# Patient Record
Sex: MALE | Race: WHITE | NOT HISPANIC OR LATINO | Employment: OTHER | URBAN - METROPOLITAN AREA
[De-identification: names, ages, dates, MRNs, and addresses within clinical notes are randomized per-mention and may not be internally consistent; named-entity substitution may affect disease eponyms.]

---

## 2017-01-20 ENCOUNTER — TELEPHONE (OUTPATIENT)
Dept: PEDIATRICS | Facility: CLINIC | Age: 67
End: 2017-01-20

## 2017-01-20 DIAGNOSIS — R25.1 TREMOR: Primary | ICD-10-CM

## 2017-01-20 RX ORDER — PRIMIDONE 50 MG/1
50 TABLET ORAL AT BEDTIME
Qty: 30 TABLET | Refills: 2 | Status: SHIPPED | OUTPATIENT
Start: 2017-01-20 | End: 2017-03-14 | Stop reason: DRUGHIGH

## 2017-01-20 NOTE — TELEPHONE ENCOUNTER
"Pt notifies that he has a hereditary condition called \"central tremor\", his father, G.father & G.G.father had it. Now his older daughter(29 yrs) has mild tremor sx's. Pt has constant tremors, can be noticeable by others, worse on R side side. Denies balance issues, fall, weakness in one side of body.     He consulted with [facility] in 1974, they advised to try Inderal, but didn't try the med at that time. Because of the worsening of tremors, he would like to try any medication to help his sx's(has been reading in net - Inderal/gabapentin/mysoline). He wants 's advise on this. LOV was on 12/20/16    Pt can be reached at [phone](OK to LM).    Dayanna, RN  Triage Nurse          "

## 2017-01-20 NOTE — TELEPHONE ENCOUNTER
Pt called, reached VM.  Recommend mysoline 50 mg QHS. Rx sent to [facility].   Call if has questions or if the med does not work or has side effects.

## 2017-03-14 ENCOUNTER — TELEPHONE (OUTPATIENT)
Dept: PEDIATRICS | Facility: CLINIC | Age: 67
End: 2017-03-14

## 2017-03-14 DIAGNOSIS — R25.1 TREMOR: ICD-10-CM

## 2017-03-14 RX ORDER — PRIMIDONE 50 MG/1
100 TABLET ORAL AT BEDTIME
Qty: 60 TABLET | Refills: 2 | Status: SHIPPED | OUTPATIENT
Start: 2017-03-14 | End: 2017-03-29

## 2017-03-14 NOTE — TELEPHONE ENCOUNTER
Please call:  The dose of Primidone can be increased. I recommend going to 100 mg at bedtime. He can take 2 of the 50 mg tablets at one time.    The dose can go up further if needed. I recommend trying the 100 mg dose for 1-2 weeks, then call if he needs a higher dose.    A new prescription for 2 tablets at bedtime was sent to [facility]

## 2017-03-14 NOTE — TELEPHONE ENCOUNTER
Patient calling with an update on the Primidone 50 mg qhs. Helped decrease tremors when taking during the first couple weeks, but now they are coming back.     Routing to Dr. Chaney to advise on either a dose increase or alternative medication.     Call back patient on [phone], ok to leave detailed message.

## 2017-03-15 NOTE — TELEPHONE ENCOUNTER
Called and spoke with patient. Confirmed with patient  Dr. Chaney's instructions regarding Primidone. Sandra Billy MA

## 2017-03-29 ENCOUNTER — TELEPHONE (OUTPATIENT)
Dept: PEDIATRICS | Facility: CLINIC | Age: 67
End: 2017-03-29

## 2017-03-29 NOTE — TELEPHONE ENCOUNTER
Pt called. Can decrease to 50 mg nightly for 1 week then can d/c.  Consider propranolol 60 mg daily if he wants in the future.

## 2017-03-29 NOTE — TELEPHONE ENCOUNTER
Pt. Calling states the Primadone is still not helping. Currently taking 100 mg dose tablets at bedtime.    He is wondering if he can taper down as he does not like the way he feels. He would rather not take it.     Please advise.     Cassidy Lemus, RN, BSN, PHN

## 2017-05-24 ENCOUNTER — TRANSFERRED RECORDS (OUTPATIENT)
Dept: HEALTH INFORMATION MANAGEMENT | Facility: CLINIC | Age: 67
End: 2017-05-24

## 2017-05-31 ENCOUNTER — TELEPHONE (OUTPATIENT)
Dept: PEDIATRICS | Facility: CLINIC | Age: 67
End: 2017-05-31

## 2017-08-15 ENCOUNTER — TELEPHONE (OUTPATIENT)
Dept: PEDIATRICS | Facility: CLINIC | Age: 67
End: 2017-08-15

## 2017-09-06 ENCOUNTER — TELEPHONE (OUTPATIENT)
Dept: PEDIATRICS | Facility: CLINIC | Age: 67
End: 2017-09-06

## 2017-09-06 DIAGNOSIS — N52.9 ERECTILE DYSFUNCTION, UNSPECIFIED ERECTILE DYSFUNCTION TYPE: Primary | ICD-10-CM

## 2017-09-06 RX ORDER — SILDENAFIL CITRATE 20 MG/1
TABLET ORAL
Qty: 30 TABLET | Refills: 11 | Status: SHIPPED | OUTPATIENT
Start: 2017-09-06 | End: 2018-03-10

## 2017-09-06 NOTE — TELEPHONE ENCOUNTER
Please call:  Rx for sildenafil 20 mg was sent to [facility].  Dosing is 2-5 pills per dose ( mg) as needed.

## 2017-12-22 ENCOUNTER — OFFICE VISIT (OUTPATIENT)
Dept: PEDIATRICS | Facility: CLINIC | Age: 67
End: 2017-12-22
Payer: COMMERCIAL

## 2017-12-22 ENCOUNTER — RADIANT APPOINTMENT (OUTPATIENT)
Dept: GENERAL RADIOLOGY | Facility: CLINIC | Age: 67
End: 2017-12-22
Attending: INTERNAL MEDICINE
Payer: COMMERCIAL

## 2017-12-22 VITALS
HEIGHT: 69 IN | HEART RATE: 46 BPM | TEMPERATURE: 97.8 F | OXYGEN SATURATION: 95 % | DIASTOLIC BLOOD PRESSURE: 70 MMHG | SYSTOLIC BLOOD PRESSURE: 124 MMHG | WEIGHT: 194 LBS | BODY MASS INDEX: 28.73 KG/M2

## 2017-12-22 DIAGNOSIS — N52.9 ERECTILE DYSFUNCTION, UNSPECIFIED ERECTILE DYSFUNCTION TYPE: ICD-10-CM

## 2017-12-22 DIAGNOSIS — M79.604 PAIN OF RIGHT LOWER EXTREMITY: ICD-10-CM

## 2017-12-22 DIAGNOSIS — Z13.6 SCREENING FOR ABDOMINAL AORTIC ANEURYSM: ICD-10-CM

## 2017-12-22 DIAGNOSIS — Z00.00 ROUTINE GENERAL MEDICAL EXAMINATION AT A HEALTH CARE FACILITY: Primary | ICD-10-CM

## 2017-12-22 LAB
CHOLEST SERPL-MCNC: 165 MG/DL
GLUCOSE SERPL-MCNC: 93 MG/DL (ref 70–99)
HDLC SERPL-MCNC: 54 MG/DL
LDLC SERPL CALC-MCNC: 95 MG/DL
NONHDLC SERPL-MCNC: 111 MG/DL
PSA SERPL-ACNC: 1.83 UG/L (ref 0–4)
TRIGL SERPL-MCNC: 78 MG/DL

## 2017-12-22 PROCEDURE — 99397 PER PM REEVAL EST PAT 65+ YR: CPT | Performed by: INTERNAL MEDICINE

## 2017-12-22 PROCEDURE — 73590 X-RAY EXAM OF LOWER LEG: CPT | Mod: RT

## 2017-12-22 PROCEDURE — 82947 ASSAY GLUCOSE BLOOD QUANT: CPT | Performed by: INTERNAL MEDICINE

## 2017-12-22 PROCEDURE — G0103 PSA SCREENING: HCPCS | Performed by: INTERNAL MEDICINE

## 2017-12-22 PROCEDURE — 80061 LIPID PANEL: CPT | Performed by: INTERNAL MEDICINE

## 2017-12-22 PROCEDURE — 36415 COLL VENOUS BLD VENIPUNCTURE: CPT | Performed by: INTERNAL MEDICINE

## 2017-12-22 PROCEDURE — 84403 ASSAY OF TOTAL TESTOSTERONE: CPT | Performed by: INTERNAL MEDICINE

## 2017-12-22 PROCEDURE — 99213 OFFICE O/P EST LOW 20 MIN: CPT | Mod: 25 | Performed by: INTERNAL MEDICINE

## 2017-12-22 ASSESSMENT — ACTIVITIES OF DAILY LIVING (ADL)
CURRENT_FUNCTION: NO ASSISTANCE NEEDED
I_NEED_ASSISTANCE_FOR_THE_FOLLOWING_DAILY_ACTIVITIES:: NO ASSISTANCE IS NEEDED

## 2017-12-22 NOTE — LETTER
[facility]  [address] [place] [id number]                  [phone]   January 2, 2018    Ru Coombs  [address] [place] [address]      Ru:     Your tests are all complete. The results show:     1. Your testosterone level is in the normal range. You do not need a supplement at this time.     2. Your cholesterol profile looks great!     Your Lipid Goals:   Cholesterol: Desirable is less than 200, Borderline is 200-240   HDL (Good Cholesterol): Desirable is greater than 40   LDL (Bad Cholesterol): Desirable is less than 130, Borderline 130-160   Triglycerides (Fats in the Blood): Desirable is less than 150,  Borderline is 150-200     3. Your glucose (blood sugar, a diabetes screen) is normal.     4. Your PSA (prostate level) is also normal.     5. Your abdominal aortic ultrasound shows that you do not have an aneurysm. There is some plaque noted; this is common to see. To help control the plaque build-up, I recommend that you continue with what you are currently doing with regular exercise, keeping your cholesterol levels controlled and keeping your weight controlled.       Please feel free to contact me if you have any questions or concerns.       James Chaney MD         Results for orders placed or performed in visit on 12/22/17   Lipid Profile (Chol, Trig, HDL, LDL calc)   Result Value Ref Range    Cholesterol 165 <200 mg/dL    Triglycerides 78 <150 mg/dL    HDL Cholesterol 54 >39 mg/dL    LDL Cholesterol Calculated 95 <100 mg/dL    Non HDL Cholesterol 111 <130 mg/dL   Glucose   Result Value Ref Range    Glucose 93 70 - 99 mg/dL   PSA, screen   Result Value Ref Range    PSA 1.83 0 - 4 ug/L   Testosterone, total   Result Value Ref Range    Testosterone Total 785 240 - 950 ng/dL   ORTHO [facility] REFERRAL    Impression    Patient is scheduled with Dr Frank in  on 1-4-18.

## 2017-12-22 NOTE — NURSING NOTE
"Chief Complaint   Patient presents with     Physical       Initial /70 (Cuff Size: Adult Regular)  Pulse (!) 46  Temp 97.8  F (36.6  C) (Oral)  Ht 5' 9\" (1.753 m)  Wt 194 lb (88 kg)  SpO2 95%  BMI 28.65 kg/m2 Estimated body mass index is 28.65 kg/(m^2) as calculated from the following:    Height as of this encounter: 5' 9\" (1.753 m).    Weight as of this encounter: 194 lb (88 kg).  Medication Reconciliation: complete    Raina Mooney   "

## 2017-12-22 NOTE — PROGRESS NOTES
SUBJECTIVE:   Ru Coombs is a 67 year old male who presents for Preventive Visit.    Are you in the first 12 months of your Medicare coverage?  No    Physical   Annual:     Getting at least 3 servings of Calcium per day::  Yes    Bi-annual eye exam::  Yes    Dental care twice a year::  Yes    Sleep apnea or symptoms of sleep apnea::  None    Diet::  Regular (no restrictions)    Frequency of exercise::  4-5 days/week    Duration of exercise::  30-45 minutes    Taking medications regularly::  Yes    Medication side effects::  None    Additional concerns today::  YES    Ability to successfully perform activities of daily living: no assistance needed  Home Safety:  No safety concerns identified  Hearing Impairment: no hearing concerns    Ongoing right leg pain with occasional muscle cramps. Did have a fall over the past summer while working on home remodel. Initial pain in the right hip area. Now starts at the knee and radiates into the foot.  Lateral aspect of the knee near the proximal tibia. No knee swelling. No current hip pain. No numbness in the leg. Sx are better w/ rest.      Snoring has improved - using wedge pillow.    ED. Takes sildenafil prn. Has significant decrease in libido. Questions if he could have a low testosterone level.    Fall risk:  Fallen 2 or more times in the past year?: No  Any fall with injury in the past year?: No    COGNITIVE SCREEN  1) Repeat 3 items (Banana, Sunrise, Chair)    2) Clock draw: NORMAL  3) 3 item recall: Recalls 3 objects  Results: 3 items recalled: COGNITIVE IMPAIRMENT LESS LIKELY    Mini-CogTM Copyright S Lety. Licensed by the author for use in [facility]; reprinted with permission (artem@[facility]). All rights reserved.          Reviewed and updated as needed this visit by clinical staff  Tobacco  Allergies  Meds         Reviewed and updated as needed this visit by Provider  Tobacco  Allergies  Meds  Problems  Med Hx  Surg Hx  Fam Hx  Soc Hx        "  Social History   Substance Use Topics     Smoking status: Former Smoker     Packs/day: 3.00     Years: 10.00     Quit date: 1/1/1988     Smokeless tobacco: Never Used      Comment: quit 1988     Alcohol use 0.0 oz/week     0 Standard drinks or equivalent per week      Comment: No more than 3 drinks per week        Alcohol Use 12/22/2017   If you drink alcohol, do you typically have greater than 3 drinks per day OR greater than 7 drinks per week?   No   No flowsheet data found.    Today's PHQ-2 Score:   PHQ-2 ( 1999 Pf[employer]) 12/22/2017   Q1: Little interest or pleasure in doing things 0   Q2: Feeling down, depressed or hopeless 0   PHQ-2 Score 0   Q1: Little interest or pleasure in doing things Not at all   Q2: Feeling down, depressed or hopeless Not at all   PHQ-2 Score 0       Do you feel safe in your environment - Yes    Do you have a Health Care Directive?: Yes: Advance Directive has been received and scanned.    Current providers sharing in care for this patient include:   Patient Care Team:  James Chaney MD as PCP - General    The following health maintenance items are reviewed in Epic and correct as of today:  Health Maintenance   Topic Date Due     AORTIC ANEURYSM SCREENING (SYSTEM ASSIGNED)  10/11/2014     INFLUENZA VACCINE (SYSTEM ASSIGNED)  09/01/2017     FALL RISK ASSESSMENT  12/20/2017     ADVANCE DIRECTIVE PLANNING Q5 YRS  12/15/2020     LIPID MONITORING Q5 YEARS  12/20/2021     COLONOSCOPY Q10 YR  01/13/2025     TETANUS Q10 YR  01/05/2026     PNEUMOCOCCAL  Completed     HEPATITIS C SCREENING  Completed     Labs reviewed in EPIC      Review of Systems  Constitutional, HEENT, cardiovascular, pulmonary, gi and gu systems are negative, except as otherwise noted.      OBJECTIVE:   /70 (Cuff Size: Adult Regular)  Pulse (!) 46  Temp 97.8  F (36.6  C) (Oral)  Ht 5' 9\" (1.753 m)  Wt 194 lb (88 kg)  SpO2 95%  BMI 28.65 kg/m2 Estimated body mass index is 28.65 kg/(m^2) as calculated from the " "following:    Height as of this encounter: 5' 9\" (1.753 m).    Weight as of this encounter: 194 lb (88 kg).  Physical Exam  GENERAL: healthy, alert and no distress  EYES: Eyes grossly normal to inspection, PERRL and conjunctivae and sclerae normal  HENT: ear canals and TM's normal, nose and mouth without ulcers or lesions  NECK: no adenopathy, no asymmetry, masses, or scars and thyroid normal to palpation  RESP: lungs clear to auscultation - no rales, rhonchi or wheezes  CV: regular rate and rhythm, normal S1 S2, no S3 or S4, no murmur, click or rub, no peripheral edema and peripheral pulses strong  ABDOMEN: soft, nontender, no hepatosplenomegaly, no masses and bowel sounds normal  MS: right knee w/o effusion. FROM. No crepitus. Ligaments clinically intact. Negative Jayla's.   SKIN: no suspicious lesions or rashes  NEURO: Normal strength and tone, mentation intact and speech normal  PSYCH: mentation appears normal, affect normal/bright    Recent Results (from the past 744 hour(s))   XR Tibia & Fibula Right 2 Views    Narrative    RIGHT TIBIA-FIBULA TWO VIEWS 12/22/2017 8:34 AM     HISTORY:  Pain of right lower extremity.    COMPARISON: None.      Impression    IMPRESSION: Normal.    YAKELIN REYES MD          ASSESSMENT / PLAN:       ICD-10-CM    1. Routine general medical examination at a health care facility Z00.00 Lipid Profile (Chol, Trig, HDL, LDL calc)     Glucose     PSA, screen   2. Erectile dysfunction, unspecified erectile dysfunction type N52.9 Testosterone, total   3. Screening for abdominal aortic aneurysm Z13.6 US abdominal aorta limited   4. Pain of right lower extremity M79.604 XR Tibia & Fibula Right 2 Views     ORTHO  REFERRAL     OFFICE/OUTPT VISIT,EST,LEVL III     Right leg pain - unclear cause. Could be nerve impingement vs ligament vs other. Will refer to [facility] for further evaluation.     ED. Check T level today.     Hx of smoking. AAA u/s ordered.     End of Life " "Planning:  Patient currently has an advanced directive: Yes.  Practitioner is supportive of decision.    COUNSELING:  Reviewed preventive health counseling, as reflected in patient instructions        Estimated body mass index is 28.65 kg/(m^2) as calculated from the following:    Height as of this encounter: 5' 9\" (1.753 m).    Weight as of this encounter: 194 lb (88 kg).     reports that he quit smoking about 29 years ago. He has a 30.00 pack-year smoking history. He has never used smokeless tobacco.        Appropriate preventive services were discussed with this patient, including applicable screening as appropriate for cardiovascular disease, diabetes, osteopenia/osteoporosis, and glaucoma.  As appropriate for age/gender, discussed screening for colorectal cancer, prostate cancer. Checklist reviewing preventive services available has been given to the patient.    Reviewed patients plan of care and provided an AVS. The Basic Care Plan (routine screening as documented in Health Maintenance) for Ru meets the Care Plan requirement. This Care Plan has been established and reviewed with the Patient.      James Chaney MD  [facility]  "

## 2017-12-22 NOTE — PATIENT INSTRUCTIONS
Preventive Health Recommendations:     Yearly exam:             See your health care provider every year in order to  o   Review health changes.   o   Discuss preventive care.    o   Review your medicines.    Every 1-2 years, have a diabetes test (fasting glucose).     Every 1-2 years, have a cholesterol test.     Have a colonoscopy again in 2025.  Shots:     Get a flu shot each year.     Get a tetanus shot every 10 years.   Nutrition:     Eat at least 5 servings of fruits and vegetables each day.     Eat whole-grain bread, whole-wheat pasta and brown rice instead of white grains and rice.     Get adequate Calcium and Vitamin D.   Lifestyle    Exercise for at least 150 minutes a week (30 minutes a day, 5 days a week). This will help you control your weight and prevent disease.     Limit alcohol to one drink per day.     No smoking.     Wear sunscreen to prevent skin cancer.     See your dentist every six months for an exam and cleaning.     See your eye doctor every 1 to 2 years to screen for conditions such as glaucoma, macular degeneration and cataracts.

## 2017-12-22 NOTE — MR AVS SNAPSHOT
After Visit Summary   12/22/2017    Ru Coombs    MRN: [id number]           Patient Information     Date Of Birth          1950        Visit Information        Provider Department      12/22/2017 7:50 AM James Chaney MD [facility]        Today's Diagnoses     Routine general medical examination at a health care facility    -  1    Erectile dysfunction, unspecified erectile dysfunction type        Screening for abdominal aortic aneurysm        Pain of right lower extremity          Care Instructions      Preventive Health Recommendations:     Yearly exam:             See your health care provider every year in order to  o   Review health changes.   o   Discuss preventive care.    o   Review your medicines.    Every 1-2 years, have a diabetes test (fasting glucose).     Every 1-2 years, have a cholesterol test.     Have a colonoscopy again in 2025.  Shots:     Get a flu shot each year.     Get a tetanus shot every 10 years.   Nutrition:     Eat at least 5 servings of fruits and vegetables each day.     Eat whole-grain bread, whole-wheat pasta and brown rice instead of white grains and rice.     Get adequate Calcium and Vitamin D.   Lifestyle    Exercise for at least 150 minutes a week (30 minutes a day, 5 days a week). This will help you control your weight and prevent disease.     Limit alcohol to one drink per day.     No smoking.     Wear sunscreen to prevent skin cancer.     See your dentist every six months for an exam and cleaning.     See your eye doctor every 1 to 2 years to screen for conditions such as glaucoma, macular degeneration and cataracts.          Follow-ups after your visit        Additional Services     ORTHO  REFERRAL       [facility] is referring you to the Orthopedic  Services at [facility].       The  Representative will assist you in the coordination of your Orthopedic and Musculoskeletal  "Care as prescribed by your physician.    The  Representative will call you within 1 business day to help schedule your appointment, or you may contact the Critical access hospital Representative at:    All areas ~ [phone]     Type of Referral : Non Surgical       Timeframe requested: Routine    Coverage of these services is subject to the terms and limitations of your health insurance plan.  Please call member services at your health plan with any benefit or coverage questions.      If X-rays, CT or MRI's have been performed, please contact the facility where they were done to arrange for , prior to your scheduled appointment.  Please bring this referral request to your appointment and present it to your specialist.                  Future tests that were ordered for you today     Open Future Orders        Priority Expected Expires Ordered    US abdominal aorta limited Routine  12/22/2018 12/22/2017            Who to contact     If you have questions or need follow up information about today's clinic visit or your schedule please contact [facility] directly at [phone].  Normal or non-critical lab and imaging results will be communicated to you by Roomsterhart, letter or phone within 4 business days after the clinic has received the results. If you do not hear from us within 7 days, please contact the clinic through Roomsterhart or phone. If you have a critical or abnormal lab result, we will notify you by phone as soon as possible.  Submit refill requests through TopPatch or call your pharmacy and they will forward the refill request to us. Please allow 3 business days for your refill to be completed.          Additional Information About Your Visit        [facility] Information     TopPatch lets you send messages to your doctor, view your test results, renew your prescriptions, schedule appointments and more. To sign up, go to [email or web address] . Click on \"Log in\" on the left side of the screen, " "which will take you to the Welcome page. Then click on \"Sign up Now\" on the right side of the page.     You will be asked to enter the access code listed below, as well as some personal information. Please follow the directions to create your username and password.     Your access code is: [email or web address]  Expires: 3/22/2018  8:57 AM     Your access code will  in 90 days. If you need help or a new code, please call your [place] clinic or [phone].        Care EveryWhere ID     This is your Care EveryWhere ID. This could be used by other organizations to access your [facility] [place] medical records  [id number]        Your Vitals Were     Pulse Temperature Height Pulse Oximetry BMI (Body Mass Index)       46 97.8  F (36.6  C) (Oral) 5' 9\" (1.753 m) 95% 28.65 kg/m2        Blood Pressure from Last 3 Encounters:   17 124/70   16 122/72   12/14/15 124/70    Weight from Last 3 Encounters:   17 194 lb (88 kg)   16 185 lb 1.6 oz (84 kg)   12/14/15 192 lb (87.1 kg)              We Performed the Following     Glucose     Lipid Profile (Chol, Trig, HDL, LDL calc)     ORTHO  REFERRAL     PSA, screen     Testosterone, total        Primary Care Provider Office Phone # Fax #    James Chaney [phone] [address] [place] [address]        Equal Access to Services     [facility]: Hadii aad ku hadasho Soomaali, waaxda luqadaha, qaybta kaalmada adeegyada, arnold peterson . So [facility] [phone].    ATENCIÓN: Si habla español, tiene a govea disposición servicios gratuitos de asistencia lingüística. Llame al [phone].    We comply with applicable federal civil rights laws and [place] laws. We do not discriminate on the basis of race, color, national origin, age, disability, sex, sexual orientation, or gender identity.            Thank you!     Thank you for choosing [facility]  for your care. Our goal is always to provide " you with excellent care. Hearing back from our patients is one way we can continue to improve our services. Please take a few minutes to complete the written survey that you may receive in the mail after your visit with us. Thank you!             Your Updated Medication List - Protect others around you: Learn how to safely use, store and throw away your medicines at www.disposemymeds.org.          This list is accurate as of: 12/22/17  8:57 AM.  Always use your most recent med list.                   Brand Name Dispense Instructions for use Diagnosis    aspirin 325 MG EC tablet     40 tablet    Take 1 tablet (325 mg) by mouth daily    Routine general medical examination at a health care facility       Multi-vitamin Tabs tablet   Generic drug:  multivitamin, therapeutic with minerals           OMEGA-3 FISH OIL PO      Take 1,000 mg by mouth once        Selenium 100 MCG Caps      1 qd        sildenafil 20 MG tablet    REVATIO    30 tablet     mg po QD prn    Erectile dysfunction, unspecified erectile dysfunction type

## 2017-12-27 LAB — TESTOST SERPL-MCNC: 785 NG/DL (ref 240–950)

## 2017-12-29 ENCOUNTER — HOSPITAL ENCOUNTER (OUTPATIENT)
Dept: ULTRASOUND IMAGING | Facility: CLINIC | Age: 67
Discharge: HOME | End: 2017-12-29
Attending: INTERNAL MEDICINE | Admitting: INTERNAL MEDICINE
Payer: COMMERCIAL

## 2017-12-29 DIAGNOSIS — Z13.6 SCREENING FOR ABDOMINAL AORTIC ANEURYSM: ICD-10-CM

## 2017-12-29 PROCEDURE — 76775 US EXAM ABDO BACK WALL LIM: CPT

## 2018-01-04 ENCOUNTER — OFFICE VISIT (OUTPATIENT)
Dept: ORTHOPEDICS | Facility: CLINIC | Age: 68
End: 2018-01-04
Payer: COMMERCIAL

## 2018-01-04 ENCOUNTER — RADIANT APPOINTMENT (OUTPATIENT)
Dept: GENERAL RADIOLOGY | Facility: CLINIC | Age: 68
End: 2018-01-04
Attending: FAMILY MEDICINE
Payer: COMMERCIAL

## 2018-01-04 VITALS
DIASTOLIC BLOOD PRESSURE: 80 MMHG | SYSTOLIC BLOOD PRESSURE: 130 MMHG | HEIGHT: 69 IN | BODY MASS INDEX: 28.73 KG/M2 | WEIGHT: 194 LBS

## 2018-01-04 DIAGNOSIS — M25.551 PAIN IN JOINT INVOLVING RIGHT PELVIC REGION AND THIGH: Primary | ICD-10-CM

## 2018-01-04 DIAGNOSIS — M25.551 PAIN IN JOINT INVOLVING RIGHT PELVIC REGION AND THIGH: ICD-10-CM

## 2018-01-04 DIAGNOSIS — M70.61 TROCHANTERIC BURSITIS OF RIGHT HIP: ICD-10-CM

## 2018-01-04 PROCEDURE — 99203 OFFICE O/P NEW LOW 30 MIN: CPT | Performed by: FAMILY MEDICINE
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
## 2018-01-04 NOTE — MR AVS SNAPSHOT
After Visit Summary   1/4/2018    Ru Coombs    MRN: [id number]           Patient Information     Date Of Birth          1950        Visit Information        Provider Department      1/4/2018 8:20 AM Travis Frank MD [facility]        Today's Diagnoses     Pain in joint involving right pelvic region and thigh    -  1    Trochanteric bursitis of right hip           Follow-ups after your visit        Additional Services     [facility] PT, HAND, AND CHIROPRACTIC REFERRAL       **This order will print in the [facility] Scheduling Office**    Physical Therapy, Hand Therapy and Chiropractic Care are available through:    *[facility]  *[facility]  *[facility]    Call one number to schedule at any of the above locations: [phone].    Your provider has referred you to: Physical Therapy at [facility] or [facility]    Indication/Reason for Referral:    Pain in joint involving right pelvic region and thigh  Trochanteric bursitis of right hip    Onset of Illness: 9 months  Therapy Orders: Evaluate and Treat  Special Programs: None  Special Request: None    Khloe Ledbetter      Additional Comments for the Therapist or Chiropractor: pt wants only 1-2 visits and HEP    Please be aware that coverage of these services is subject to the terms and limitations of your health insurance plan.  Call member services at your health plan with any benefit or coverage questions.      Please bring the following to your appointment:    *Your personal calendar for scheduling future appointments  *Comfortable clothing                  Who to contact     If you have questions or need follow up information about today's clinic visit or your schedule please contact [facility] directly at [phone].  Normal or non-critical lab and imaging results will be communicated to you by MyChart, letter or phone within 4 business days after the clinic  "has received the results. If you do not hear from us within 7 days, please contact the clinic through [employer] or phone. If you have a critical or abnormal lab result, we will notify you by phone as soon as possible.  Submit refill requests through [employer] or call your pharmacy and they will forward the refill request to us. Please allow 3 business days for your refill to be completed.          Additional Information About Your Visit        [employer] Information     [employer] lets you send messages to your doctor, view your test results, renew your prescriptions, schedule appointments and more. To sign up, go to [email or web address] . Click on \"Log in\" on the left side of the screen, which will take you to the Welcome page. Then click on \"Sign up Now\" on the right side of the page.     You will be asked to enter the access code listed below, as well as some personal information. Please follow the directions to create your username and password.     Your access code is: [email or web address]  Expires: 3/22/2018  8:57 AM     Your access code will  in 90 days. If you need help or a new code, please call your [facility] clinic or [phone].        Care EveryWhere ID     This is your Care EveryWhere ID. This could be used by other organizations to access your [facility] medical records  [id number]        Your Vitals Were     Height BMI (Body Mass Index)                5' 9\" (1.753 m) 28.65 kg/m2           Blood Pressure from Last 3 Encounters:   18 130/80   17 124/70   16 122/72    Weight from Last 3 Encounters:   18 194 lb (88 kg)   17 194 lb (88 kg)   16 185 lb 1.6 oz (84 kg)              We Performed the Following     [facility] PT, HAND, AND CHIROPRACTIC REFERRAL        Primary Care Provider Office Phone # Fax #    James Chaney [phone] [address] [place] [address]        Equal Access to Services     [facility]: Gladys Pulido, " brendada gordomonisha, treasureybta kaprecious amin, arnold pabloalen ah. So [facility] [phone].    ATENCIÓN: Si kunal momin, tiene a govea disposición servicios gratuitos de asistencia lingüística. Bladimir al [phone].    We comply with applicable federal civil rights laws and [place] laws. We do not discriminate on the basis of race, color, national origin, age, disability, sex, sexual orientation, or gender identity.            Thank you!     Thank you for choosing [facility]  for your care. Our goal is always to provide you with excellent care. Hearing back from our patients is one way we can continue to improve our services. Please take a few minutes to complete the written survey that you may receive in the mail after your visit with us. Thank you!             Your Updated Medication List - Protect others around you: Learn how to safely use, store and throw away your medicines at www.disposemymeds.org.          This list is accurate as of: 1/4/18 11:54 AM.  Always use your most recent med list.                   Brand Name Dispense Instructions for use Diagnosis    aspirin 325 MG EC tablet     40 tablet    Take 1 tablet (325 mg) by mouth daily    Routine general medical examination at a health care facility       calcium-vitamin D 600-400 MG-UNIT per tablet    CALTRATE     Take 1 tablet by mouth 2 times daily        Multi-vitamin Tabs tablet   Generic drug:  multivitamin, therapeutic with minerals           OMEGA-3 FISH OIL PO      Take 1,000 mg by mouth once        Selenium 100 MCG Caps      1 qd        sildenafil 20 MG tablet    REVATIO    30 tablet     mg po QD prn    Erectile dysfunction, unspecified erectile dysfunction type
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
## 2018-01-08 ENCOUNTER — THERAPY VISIT (OUTPATIENT)
Dept: PHYSICAL THERAPY | Facility: CLINIC | Age: 68
End: 2018-01-08
Payer: COMMERCIAL

## 2018-01-08 DIAGNOSIS — M25.551 HIP PAIN, RIGHT: Primary | ICD-10-CM

## 2018-01-08 PROCEDURE — 97110 THERAPEUTIC EXERCISES: CPT | Mod: GP | Performed by: PHYSICAL THERAPIST

## 2018-01-08 PROCEDURE — 97161 PT EVAL LOW COMPLEX 20 MIN: CPT | Mod: GP | Performed by: PHYSICAL THERAPIST

## 2018-01-08 PROCEDURE — 97530 THERAPEUTIC ACTIVITIES: CPT | Mod: GP | Performed by: PHYSICAL THERAPIST

## 2018-01-08 ASSESSMENT — ACTIVITIES OF DAILY LIVING (ADL)
STANDING_FOR_15_MINUTES: NO DIFFICULTY AT ALL
RECREATIONAL_ACTIVITIES: NO DIFFICULTY AT ALL
GOING_DOWN_1_FLIGHT_OF_STAIRS: NO DIFFICULTY AT ALL
STEPPING_UP_AND_DOWN_CURBS: NO DIFFICULTY AT ALL
HOS_ADL_HIGHEST_POTENTIAL_SCORE: 68
HOS_ADL_COUNT: 17
GETTING_INTO_AND_OUT_OF_A_BATHTUB: UNABLE TO DO
GOING_UP_1_FLIGHT_OF_STAIRS: MODERATE DIFFICULTY
HOS_ADL_ITEM_SCORE_TOTAL: 56
HOW_WOULD_YOU_RATE_YOUR_CURRENT_LEVEL_OF_FUNCTION_DURING_YOUR_USUAL_ACTIVITIES_OF_DAILY_LIVING_FROM_0_TO_100_WITH_100_BEING_YOUR_LEVEL_OF_FUNCTION_PRIOR_TO_YOUR_HIP_PROBLEM_AND_0_BEING_THE_INABILITY_TO_PERFORM_ANY_OF_YOUR_USUAL_DAILY_ACTIVITIES?: 85
GETTING_INTO_AND_OUT_OF_AN_AVERAGE_CAR: SLIGHT DIFFICULTY
WALKING_INITIALLY: NO DIFFICULTY AT ALL
WALKING_15_MINUTES_OR_GREATER: SLIGHT DIFFICULTY
WALKING_UP_STEEP_HILLS: MODERATE DIFFICULTY
HOS_ADL_SCORE(%): 82.35
HEAVY_WORK: NO DIFFICULTY AT ALL
PUTTING_ON_SOCKS_AND_SHOES: NO DIFFICULTY AT ALL
LIGHT_TO_MODERATE_WORK: SLIGHT DIFFICULTY
DEEP_SQUATTING: NO DIFFICULTY AT ALL
ROLLING_OVER_IN_BED: SLIGHT DIFFICULTY
TWISTING/PIVOTING_ON_INVOLVED_LEG: NO DIFFICULTY AT ALL
WALKING_DOWN_STEEP_HILLS: NO DIFFICULTY AT ALL
SITTING_FOR_15_MINUTES: NO DIFFICULTY AT ALL
WALKING_APPROXIMATELY_10_MINUTES: NO DIFFICULTY AT ALL

## 2018-01-08 NOTE — MR AVS SNAPSHOT
After Visit Summary   1/8/2018    Ru Coombs    MRN: [id number]           Patient Information     Date Of Birth          1950        Visit Information        Provider Department      1/8/2018 8:50 AM Raymond Machado, PT [facility]        Today's Diagnoses     Hip pain, right    -  1       Follow-ups after your visit        Your next 10 appointments already scheduled     Adriano 15, 2018  8:50 AM CST   JACKELINE Extremity with Raymond Machado PT   [facility] ([facility]  )    [address] [place] [id number]   [phone]            Jan 22, 2018  8:50 AM CST   JACKELINE Extremity with Raymond Machado PT   [facility] ([facility]  )    [address] [place] [id number] [phone]            Jan 29, 2018  8:50 AM CST   JACKELINE Extremity with Raymond Machado PT   [facility] ([facility]  )    [address] [place] [id number]   [phone]              Who to contact     If you have questions or need follow up information about today's clinic visit or your schedule please contact [facility] directly at [phone].  Normal or non-critical lab and imaging results will be communicated to you by MyChart, letter or phone within 4 business days after the clinic has received the results. If you do not hear from us within 7 days, please contact the clinic through MyChart or phone. If you have a critical or abnormal lab result, we will notify you by phone as soon as possible.  Submit refill requests through [employer] or call your pharmacy and they will forward the refill request to us. Please allow 3 business days for your refill to be completed.          Additional Information About Your Visit       "  MyChart Information     [employer] system lets you send messages to your doctor, view your test results, renew your prescriptions, schedule appointments and more. To sign up, go to [email or web address] . Click on \"Log in\" on the left side of the screen, which will take you to the Welcome page. Then click on \"Sign up Now\" on the right side of the page.     You will be asked to enter the access code listed below, as well as some personal information. Please follow the directions to create your username and password.     Your access code is: [email or web address]  Expires: 3/22/2018  8:57 AM     Your access code will  in 90 days. If you need help or a new code, please call your [facility] clinic or [phone].        Care EveryWhere ID     This is your Care EveryWhere ID. This could be used by other organizations to access your [facility] medical records  [id number]         Blood Pressure from Last 3 Encounters:   18 130/80   17 124/70   16 122/72    Weight from Last 3 Encounters:   18 88 kg (194 lb)   17 88 kg (194 lb)   16 84 kg (185 lb 1.6 oz)              We Performed the Following     PT Eval, Low Complexity (75[address])     Therapeutic Activities     Therapeutic Exercises        Primary Care Provider Office Phone # Fax #    James Chaney [phone] [address] [place] [address]        Equal Access to Services     [facility]: Hadii aad ku hadasho Soomaali, waaxda luqadaha, qaybta kaalmada adeegyada, waxay tia peterson . So [facility] [phone].    ATENCIÓN: Si habla español, tiene a govea disposición servicios gratuitos de asistencia lingüística. Llame al [phone].    We comply with applicable federal civil rights laws and [place] laws. We do not discriminate on the basis of race, color, national origin, age, disability, sex, sexual orientation, or gender identity.            Thank you!     Thank you for choosing [facility]  for your care. Our goal is always to provide you with excellent care. Hearing back from our patients is one way we can continue to improve our services. Please take a few minutes to complete the written survey that you may receive in the mail after your visit with us. Thank you!             Your Updated Medication List - Protect others around you: Learn how to safely use, store and throw away your medicines at www.disposemymeds.org.          This list is accurate as of: 1/8/18  9:56 AM.  Always use your most recent med list.                   Brand Name Dispense Instructions for use Diagnosis    aspirin 325 MG EC tablet     40 tablet    Take 1 tablet (325 mg) by mouth daily    Routine general medical examination at a health care facility       calcium-vitamin D 600-400 MG-UNIT per tablet    CALTRATE     Take 1 tablet by mouth 2 times daily        Multi-vitamin Tabs tablet   Generic drug:  multivitamin, therapeutic with minerals           OMEGA-3 FISH OIL PO      Take 1,000 mg by mouth once        Selenium 100 MCG Caps      1 qd        sildenafil 20 MG tablet    REVATIO    30 tablet     mg po QD prn    Erectile dysfunction, unspecified erectile dysfunction type

## 2018-01-08 NOTE — PROGRESS NOTES
[facility] Initial Evaluation  Subjective:  Patient is a 67 year old male presenting with rehab right hip hpi.       Condition occurred: at home.  This is a new condition  Pt presents to PT with c/o R hip pain with insidious onset 9 months ago.  Pt reports he did have a fall about 1 year ago and he wonders if this could be related to his pain.     He reports his pain is the worst with driving and sleeping at night.     Pt also goes to the gym consistently.  He does a 7 mile bike ride and does some strength training as well.      Pt is retired.    PMH: Hard skin on feet, L knee scope 6 years ago, B inguinal hernia surgeries.    Site of Pain: lateral hip pain.  Radiates to: lateral leg pain down into the foot.  Pain is described as shooting, burning and aching and is intermittent and reported as 6/10.  Associated symptoms:  Loss of motion/stiffness and loss of strength. Pain is the same all the time.  Exacerbated by: sitting, driving, sleeping. Relieved by: walking, movement.  Since onset symptoms are gradually improving.  Special testing: n/a.  Previous treatment: none.  Improvement with previous treatment: n/a.  General health as reported by patient is good.                                              Objective:  System                                           Hip Evaluation  Hip PROM:    Flexion: Left: WNL   Right: WNL, +        Internal Rotation: Left: WNL    Right: WNL  External Rotation: Left: WNL    Right: Lacking 5 degrees, +              Hip Strength:    Flexion:   Left: 4/5   Pain:  Right: 4-/5   Pain:                    Extension:  Left: 4-/5  Pain:Right: 4-/5    Pain:    Abduction:  Left: 4-/5     Pain:Right: 3+/5   +   Pain:                    Hip Palpation:  Palpations normal left hip: Mild TTP R greater troch.      Functional Testing:  Functional test hip: Squat anterior knee excursion,  SLS 1-2s B, SL squat unable, bridge pelvic drop B.                           General  Evaluation:                              Gait:  Gait wnl general: WideBOS  without AD.                                         ROS    Assessment/Plan:    Patient is a 67 year old male with right side hip complaints.    Patient has the following significant findings with corresponding treatment plan.                Diagnosis 1:  R hip pain  Pain -  hot/cold therapy  Decreased ROM/flexibility - manual therapy and therapeutic exercise  Decreased joint mobility - manual therapy and therapeutic exercise  Decreased strength - therapeutic exercise and therapeutic activities  Impaired balance - neuro re-education and therapeutic activities  Decreased proprioception - neuro re-education and therapeutic activities  Inflammation - cold therapy  Impaired gait - gait training  Impaired muscle performance - neuro re-education  Decreased function - therapeutic activities    Therapy Evaluation Codes:   1) History comprised of:   Personal factors that impact the plan of care:      Time since onset of symptoms.    Comorbidity factors that impact the plan of care are:      None.     Medications impacting care: None.  2) Examination of Body Systems comprised of:   Body structures and functions that impact the plan of care:      Hip.   Activity limitations that impact the plan of care are:      Sitting and Laying down.  3) Clinical presentation characteristics are:   Stable/Uncomplicated.  4) Decision-Making    Low complexity using standardized patient assessment instrument and/or measureable assessment of functional outcome.  Cumulative Therapy Evaluation is: Low complexity.    Previous and current functional limitations:  (See Goal Flow Sheet for this information)    Short term and Long term goals: (See Goal Flow Sheet for this information)     Communication ability:  Patient appears to be able to clearly communicate and understand verbal and written communication and follow directions correctly.  Treatment Explanation - The following  has been discussed with the patient:   RX ordered/plan of care  Anticipated outcomes  Possible risks and side effects  This patient would benefit from PT intervention to resume normal activities.   Rehab potential is excellent.    Frequency:  1 X week, once daily  Duration:  for 6 weeks  Discharge Plan:  Achieve all LTG.  Independent in home treatment program.  Return to work with or without restrictions.  Return to previous functional level by discharge.  Reach maximal therapeutic benefit.    Please refer to the daily flowsheet for treatment today, total treatment time and time spent performing 1:1 timed codes.

## 2018-01-15 ENCOUNTER — THERAPY VISIT (OUTPATIENT)
Dept: PHYSICAL THERAPY | Facility: CLINIC | Age: 68
End: 2018-01-15
Payer: COMMERCIAL

## 2018-01-15 DIAGNOSIS — M25.551 HIP PAIN, RIGHT: ICD-10-CM

## 2018-01-15 PROCEDURE — 97530 THERAPEUTIC ACTIVITIES: CPT | Mod: GP | Performed by: PHYSICAL THERAPIST

## 2018-01-15 PROCEDURE — 97110 THERAPEUTIC EXERCISES: CPT | Mod: GP | Performed by: PHYSICAL THERAPIST

## 2018-01-22 ENCOUNTER — THERAPY VISIT (OUTPATIENT)
Dept: PHYSICAL THERAPY | Facility: CLINIC | Age: 68
End: 2018-01-22
Payer: COMMERCIAL

## 2018-01-22 DIAGNOSIS — M25.551 HIP PAIN, RIGHT: ICD-10-CM

## 2018-01-22 PROCEDURE — 97110 THERAPEUTIC EXERCISES: CPT | Mod: GP | Performed by: PHYSICAL THERAPIST

## 2018-01-22 PROCEDURE — 97530 THERAPEUTIC ACTIVITIES: CPT | Mod: GP | Performed by: PHYSICAL THERAPIST

## 2018-01-29 ENCOUNTER — THERAPY VISIT (OUTPATIENT)
Dept: PHYSICAL THERAPY | Facility: CLINIC | Age: 68
End: 2018-01-29
Payer: COMMERCIAL

## 2018-01-29 DIAGNOSIS — M25.551 HIP PAIN, RIGHT: ICD-10-CM

## 2018-01-29 PROCEDURE — 97110 THERAPEUTIC EXERCISES: CPT | Mod: GP | Performed by: PHYSICAL THERAPIST

## 2018-01-29 PROCEDURE — 97530 THERAPEUTIC ACTIVITIES: CPT | Mod: GP | Performed by: PHYSICAL THERAPIST

## 2018-02-12 ENCOUNTER — THERAPY VISIT (OUTPATIENT)
Dept: PHYSICAL THERAPY | Facility: CLINIC | Age: 68
End: 2018-02-12
Payer: COMMERCIAL

## 2018-02-12 DIAGNOSIS — M25.551 HIP PAIN, RIGHT: ICD-10-CM

## 2018-02-12 PROCEDURE — 97110 THERAPEUTIC EXERCISES: CPT | Mod: GP | Performed by: PHYSICAL THERAPIST

## 2018-02-12 PROCEDURE — 97530 THERAPEUTIC ACTIVITIES: CPT | Mod: GP | Performed by: PHYSICAL THERAPIST

## 2018-02-12 ASSESSMENT — ACTIVITIES OF DAILY LIVING (ADL)
HEAVY_WORK: MODERATE DIFFICULTY
GOING_UP_1_FLIGHT_OF_STAIRS: SLIGHT DIFFICULTY
LIGHT_TO_MODERATE_WORK: NO DIFFICULTY AT ALL
GOING_DOWN_1_FLIGHT_OF_STAIRS: NO DIFFICULTY AT ALL
DEEP_SQUATTING: NO DIFFICULTY AT ALL
HOS_ADL_HIGHEST_POTENTIAL_SCORE: 68
ROLLING_OVER_IN_BED: NO DIFFICULTY AT ALL
WALKING_UP_STEEP_HILLS: SLIGHT DIFFICULTY
WALKING_INITIALLY: SLIGHT DIFFICULTY
SITTING_FOR_15_MINUTES: SLIGHT DIFFICULTY
HOS_ADL_ITEM_SCORE_TOTAL: 59
WALKING_APPROXIMATELY_10_MINUTES: NO DIFFICULTY AT ALL
HOS_ADL_COUNT: 17
WALKING_DOWN_STEEP_HILLS: SLIGHT DIFFICULTY
RECREATIONAL_ACTIVITIES: NO DIFFICULTY AT ALL
PUTTING_ON_SOCKS_AND_SHOES: SLIGHT DIFFICULTY
STANDING_FOR_15_MINUTES: NO DIFFICULTY AT ALL
GETTING_INTO_AND_OUT_OF_A_BATHTUB: NO DIFFICULTY AT ALL
GETTING_INTO_AND_OUT_OF_AN_AVERAGE_CAR: NO DIFFICULTY AT ALL
TWISTING/PIVOTING_ON_INVOLVED_LEG: MODERATE DIFFICULTY
STEPPING_UP_AND_DOWN_CURBS: NO DIFFICULTY AT ALL
HOS_ADL_SCORE(%): 86.76
WALKING_15_MINUTES_OR_GREATER: SLIGHT DIFFICULTY

## 2018-03-07 DIAGNOSIS — N52.9 ERECTILE DYSFUNCTION, UNSPECIFIED ERECTILE DYSFUNCTION TYPE: ICD-10-CM

## 2018-03-07 RX ORDER — SILDENAFIL CITRATE 20 MG/1
TABLET ORAL
Qty: 30 TABLET | Refills: 11 | Status: CANCELLED | OUTPATIENT
Start: 2018-03-07

## 2018-03-07 NOTE — TELEPHONE ENCOUNTER
Not due for a refill.    sildenafil (REVATIO/VIAGRA) 20 MG tablet was filled on 9/6/2017, qty 30 with 11 refills.

## 2018-03-10 DIAGNOSIS — N52.9 ERECTILE DYSFUNCTION, UNSPECIFIED ERECTILE DYSFUNCTION TYPE: ICD-10-CM

## 2018-03-10 NOTE — TELEPHONE ENCOUNTER
"Requested Prescriptions   Pending Prescriptions Disp Refills     sildenafil (REVATIO) 20 MG tablet  Last Written Prescription Date:  2017  Last Fill Quantity: 30 tablet,  # refills: 11   Last office visit: 2017 with prescribing provider:  James Chaney MD   Future Office Visit:     30 tablet 11     Si-100 mg po QD prn    Erectile Dysfuction Protocol Passed    3/10/2018 11:18 AM       Passed - Absence of nitrates on medication list       Passed - Absence of Alpha Blockers on Med list       Passed - Recent (12 mo) or future (30 days) visit within the authorizing provider's specialty    Patient had office visit in the last 12 months or has a visit in the next 30 days with authorizing provider or within the authorizing provider's specialty.  See \"Patient Info\" tab in inbasket, or \"Choose Columns\" in Meds & Orders section of the refill encounter.           Passed - Patient is age 18 or older          "

## 2018-03-12 RX ORDER — SILDENAFIL CITRATE 20 MG/1
TABLET ORAL
Qty: 30 TABLET | Refills: 11 | Status: SHIPPED | OUTPATIENT
Start: 2018-03-12 | End: 2018-12-28

## 2018-12-11 ENCOUNTER — TELEPHONE (OUTPATIENT)
Dept: PEDIATRICS | Facility: CLINIC | Age: 68
End: 2018-12-11

## 2018-12-11 NOTE — TELEPHONE ENCOUNTER
"PCP KAREN:    The Pt is scheduled to see you for a full Px later this month.  He reports checking his BP at [facility] on a regular basis (automatic cuff machine).  Today his BP was much higher that normal (runs 120's/70's usually)  Today it was 156/86 and on recheck was 150/90.   This is not normal for him, and he is concerned.    No symptoms, no HA, dizziness, visual changes or nose bleeds. No CP or SOB.   He did recently start a diet called \"eat to live\". This cuts out all meat, dairy and processed foods.     I advised him to follow up with you at upcoming visit. Also advised him to come into our pharmacy either today or tomorrow and have BP checked here by pharmacist. He agreed with plan.     Linda Townsend RN -- [facility]           "

## 2018-12-12 ENCOUNTER — ALLIED HEALTH/NURSE VISIT (OUTPATIENT)
Dept: PEDIATRICS | Facility: CLINIC | Age: 68
End: 2018-12-12
Payer: COMMERCIAL

## 2018-12-12 VITALS — DIASTOLIC BLOOD PRESSURE: 72 MMHG | SYSTOLIC BLOOD PRESSURE: 126 MMHG

## 2018-12-12 DIAGNOSIS — Z01.30 BLOOD PRESSURE CHECK: Primary | ICD-10-CM

## 2018-12-12 PROCEDURE — 99207 ZZC NO CHARGE NURSE ONLY: CPT | Performed by: INTERNAL MEDICINE

## 2018-12-12 NOTE — PROGRESS NOTES
Ru Coombs is enrolled/participating in the retail pharmacy Blood Pressure Goals Achievement Program (BPGAP).  Ru Coombs was evaluated at [facility] on December 12, 2018 at which time his blood pressure was:    BP Readings from Last 3 Encounters:   12/12/18 126/72   01/04/18 130/80   12/22/17 124/70     Reviewed lifestyle modifications for blood pressure control and reduction: including making healthy food choices, managing weight, getting regular exercise, smoking cessation, reducing alcohol consumption, monitoring blood pressure regularly.     Ru Coombs is not experiencing symptoms.    Follow-Up: BP is at goal of < 150/90 mmHg (patient 60+ years of age without diabetes).  Recommended follow-up at pharmacy in 6 months.     Recommendation to Provider: follow up in 6 months    Ru Coombs was evaluated for enrollment into the PGEN study today.    PLEASE INITIATE ENROLLMENT DISCUSSION WITH HTN PTS  1) Between 30-80 years old                                                                                                               2) BMI between 19-50                                                                                                        3) BP ?140/90 AND ?170/110 patients aged 30-59         BP ?150/90 AND ?170/110 non-diabetic patients aged 60-80       BP ?140/90 AND ?170/110 diabetic patients aged 60-80  4) Additional requirements for uncontrolled HTN patients:        Pt on only 1 class of medication  5) EXCLUDE patient if confirmation of:                  ? Cardiac disease                  ? Chronic Kidney Disease                  ? Pregnancy/Breastfeeding                  ? Secondary Hypertension/Pre-eclampsia                                 ? Vascular disease    Patient eligible for enrollment:  No  Patient interested in enrollment:  No    This note completed by: Ariadna Shah [facility]  [phone]

## 2018-12-28 ENCOUNTER — OFFICE VISIT (OUTPATIENT)
Dept: PEDIATRICS | Facility: CLINIC | Age: 68
End: 2018-12-28
Payer: COMMERCIAL

## 2018-12-28 VITALS
TEMPERATURE: 98 F | BODY MASS INDEX: 27.99 KG/M2 | DIASTOLIC BLOOD PRESSURE: 82 MMHG | OXYGEN SATURATION: 94 % | SYSTOLIC BLOOD PRESSURE: 122 MMHG | HEIGHT: 69 IN | WEIGHT: 189 LBS | HEART RATE: 58 BPM | RESPIRATION RATE: 18 BRPM

## 2018-12-28 DIAGNOSIS — Z12.5 SCREENING FOR PROSTATE CANCER: ICD-10-CM

## 2018-12-28 DIAGNOSIS — N52.9 ERECTILE DYSFUNCTION, UNSPECIFIED ERECTILE DYSFUNCTION TYPE: ICD-10-CM

## 2018-12-28 DIAGNOSIS — Z00.00 ROUTINE GENERAL MEDICAL EXAMINATION AT A HEALTH CARE FACILITY: Primary | ICD-10-CM

## 2018-12-28 LAB
CHOLEST SERPL-MCNC: 177 MG/DL
GLUCOSE SERPL-MCNC: 99 MG/DL (ref 70–99)
HDLC SERPL-MCNC: 47 MG/DL
LDLC SERPL CALC-MCNC: 104 MG/DL
NONHDLC SERPL-MCNC: 130 MG/DL
PSA SERPL-ACNC: 2.15 UG/L (ref 0–4)
TRIGL SERPL-MCNC: 130 MG/DL

## 2018-12-28 PROCEDURE — G0103 PSA SCREENING: HCPCS | Performed by: INTERNAL MEDICINE

## 2018-12-28 PROCEDURE — 36415 COLL VENOUS BLD VENIPUNCTURE: CPT | Performed by: INTERNAL MEDICINE

## 2018-12-28 PROCEDURE — 99397 PER PM REEVAL EST PAT 65+ YR: CPT | Performed by: INTERNAL MEDICINE

## 2018-12-28 PROCEDURE — 80061 LIPID PANEL: CPT | Performed by: INTERNAL MEDICINE

## 2018-12-28 PROCEDURE — 82947 ASSAY GLUCOSE BLOOD QUANT: CPT | Performed by: INTERNAL MEDICINE

## 2018-12-28 RX ORDER — SILDENAFIL CITRATE 20 MG/1
TABLET ORAL
Qty: 30 TABLET | Refills: 11 | Status: SHIPPED | OUTPATIENT
Start: 2018-12-28 | End: 2020-02-03

## 2018-12-28 ASSESSMENT — MIFFLIN-ST. JEOR: SCORE: 1612.68

## 2018-12-28 ASSESSMENT — ENCOUNTER SYMPTOMS
CONSTIPATION: 0
JOINT SWELLING: 0
HEADACHES: 0
EYE PAIN: 0
ABDOMINAL PAIN: 0
SORE THROAT: 0
WEAKNESS: 0
DIZZINESS: 0
PARESTHESIAS: 0
DIARRHEA: 0
FREQUENCY: 0
COUGH: 0
MYALGIAS: 0
PALPITATIONS: 0
SHORTNESS OF BREATH: 1
DYSURIA: 0
ARTHRALGIAS: 0
NAUSEA: 0
HEMATOCHEZIA: 0
CHILLS: 0
HEARTBURN: 0
HEMATURIA: 0

## 2018-12-28 ASSESSMENT — ACTIVITIES OF DAILY LIVING (ADL): CURRENT_FUNCTION: NO ASSISTANCE NEEDED

## 2018-12-28 NOTE — PROGRESS NOTES
"SUBJECTIVE:   Ru Coombs is a 68 year old male who presents for Preventive Visit.    Are you in the first 12 months of your Medicare coverage?  No- 10/1/14 Coverage began     Annual Wellness Visit     In general, how would you rate your overall health?  Good    Frequency of exercise:  2-3 days/week    Do you usually eat at least 4 servings of fruit and vegetables a day, include whole grains    & fiber and avoid regularly eating high fat or \"junk\" foods?  Yes    Taking medications regularly:  Yes    Medication side effects:  None    Ability to successfully perform activities of daily living:  No assistance needed    Home Safety:  No safety concerns identified    Hearing Impairment:  Feel that people are mumbling or not speaking clearly    In the past 6 months, have you been bothered by leaking of urine?  No    In general, how would you rate your overall mental or emotional health?  Good    PHQ-2 Total Score: 0    Additional concerns today:  No    Do you feel safe in your environment? Yes    Do you have a Health Care Directive?  Advance care planning was reviewed with patient  Fall risk  Fallen 2 or more times in the past year?: No  Any fall with injury in the past year?: No  Does have balance concerns    Cognitive Screening   1) Repeat 3 items (Leader, Season, Table)    2) Clock draw: NORMAL  3) 3 item recall: Recalls 3 objects  Results: 3 items recalled: COGNITIVE IMPAIRMENT LESS LIKELY    Mini-CogTM Copyright S Lety. Licensed by the author for use in [facility]; reprinted with permission ([email or web address]). All rights reserved.      Do you have sleep apnea, excessive snoring or daytime drowsiness?: no    Reviewed and updated as needed this visit by clinical staff  Tobacco  Allergies  Meds  Problems  Med Hx  Surg Hx  Fam Hx  Soc Hx          Reviewed and updated as needed this visit by Provider  Tobacco  Allergies  Meds  Problems  Med Hx  Surg Hx  Fam Hx        Social History     Tobacco " Use     Smoking status: Former Smoker     Packs/day: 3.00     Years: 10.00     Pack years: 30.00     Last attempt to quit: 1988     Years since quittin.0     Smokeless tobacco: Never Used     Tobacco comment: quit    Substance Use Topics     Alcohol use: Yes     Alcohol/week: 0.0 oz     Comment: No more than 3 drinks per week        Alcohol Use 2018   If you drink alcohol do you typically have greater than 3 drinks per day OR greater than 7 drinks per week? No       Current providers sharing in care for this patient include:   Patient Care Team:  James Chaney MD as PCP - General  James Chaney MD as PCP - Assigned PCP    The following health maintenance items are reviewed in Epic and correct as of today:  Health Maintenance   Topic Date Due     ZOSTER IMMUNIZATION (2 of 3) 2010     FALL RISK ASSESSMENT  2018     PHQ-2 Q1 YR  2018     ADVANCE DIRECTIVE PLANNING Q5 YRS  12/15/2020     LIPID MONITORING Q5 YEARS  2022     COLONOSCOPY Q10 YR  2025     DTAP/TDAP/TD IMMUNIZATION (3 - Td) 2026     INFLUENZA VACCINE  Completed     PNEUMOVAX IMMUNIZATION 65+ LOW/MEDIUM RISK  Completed     AORTIC ANEURYSM SCREENING (SYSTEM ASSIGNED)  Completed     HEPATITIS C SCREENING  Completed     IPV IMMUNIZATION  Aged Out     MENINGITIS IMMUNIZATION  Aged Out     Labs reviewed in EPIC    Review of Systems   Constitutional: Negative for chills.   HENT: Negative for congestion, ear pain, hearing loss and sore throat.    Eyes: Negative for pain and visual disturbance.   Respiratory: Positive for shortness of breath. Negative for cough.    Cardiovascular: Negative for chest pain, palpitations and peripheral edema.   Gastrointestinal: Negative for abdominal pain, constipation, diarrhea, heartburn, hematochezia and nausea.   Genitourinary: Positive for impotence. Negative for discharge, dysuria, frequency, genital sores, hematuria and urgency.   Musculoskeletal: Negative for  "arthralgias, joint swelling and myalgias.   Skin: Negative for rash.   Neurological: Negative for dizziness, weakness, headaches and paresthesias.   Psychiatric/Behavioral: Negative for mood changes.     OBJECTIVE:   /82 (BP Location: Right arm, Patient Position: Chair, Cuff Size: Adult Large)   Pulse 58   Temp 98  F (36.7  C) (Oral)   Resp 18   Ht 1.753 m (5' 9\")   Wt 85.7 kg (189 lb)   SpO2 94%   BMI 27.91 kg/m   Estimated body mass index is 27.91 kg/m  as calculated from the following:    Height as of this encounter: 1.753 m (5' 9\").    Weight as of this encounter: 85.7 kg (189 lb).  Physical Exam  GENERAL: healthy, alert and no distress  EYES: Eyes grossly normal to inspection, PERRL and conjunctivae and sclerae normal  HENT: TM's normal, nose and mouth without ulcers or lesions. Cerumen removed from left canal.  NECK: no adenopathy, no asymmetry, masses, or scars and thyroid normal to palpation  RESP: lungs clear to auscultation - no rales, rhonchi or wheezes  CV: regular rate and rhythm, normal S1 S2, no S3 or S4, no murmur, click or rub, no peripheral edema and peripheral pulses strong  ABDOMEN: soft, nontender, no hepatosplenomegaly, no masses and bowel sounds normal  MS: no gross musculoskeletal defects noted, no edema  SKIN: no suspicious lesions or rashes  NEURO: Normal strength and tone, mentation intact and speech normal  PSYCH: mentation appears normal, affect normal/bright      ASSESSMENT / PLAN:       ICD-10-CM    1. Routine general medical examination at a health care facility Z00.00 Lipid Profile (Chol, Trig, HDL, LDL calc)     Glucose   2. Erectile dysfunction, unspecified erectile dysfunction type N52.9 sildenafil (REVATIO) 20 MG tablet   3. Screening for prostate cancer Z12.5 PSA, screen       End of Life Planning:  Patient currently has an advanced directive: Yes.  Practitioner is supportive of decision.    COUNSELING:  Reviewed preventive health counseling, as reflected in patient " "instructions    BP Readings from Last 1 Encounters:   12/28/18 122/82     Estimated body mass index is 27.91 kg/m  as calculated from the following:    Height as of this encounter: 1.753 m (5' 9\").    Weight as of this encounter: 85.7 kg (189 lb).     reports that he quit smoking about 31 years ago. He has a 30.00 pack-year smoking history. he has never used smokeless tobacco.      Appropriate preventive services were discussed with this patient, including applicable screening as appropriate for cardiovascular disease, diabetes, osteopenia/osteoporosis, and glaucoma.  As appropriate for age/gender, discussed screening for colorectal cancer, prostate cancer. Checklist reviewing preventive services available has been given to the patient.    Reviewed patients plan of care and provided an AVS. The Basic Care Plan (routine screening as documented in Health Maintenance) for Ru meets the Care Plan requirement. This Care Plan has been established and reviewed with the Patient.    Counseling Resources:  ATP IV Guidelines  Pooled Cohorts Equation Calculator  Breast Cancer Risk Calculator  FRAX Risk Assessment  ICSI Preventive Guidelines  Dietary Guidelines for Americans, 2010  USDA's MyPlate  ASA Prophylaxis  Lung CA Screening    James Chaney MD  [facility]  "

## 2018-12-28 NOTE — PATIENT INSTRUCTIONS
Preventive Health Recommendations:     See your health care provider every year to    Review health changes.     Discuss preventive care.      Review your medicines.      Every 1-2 years, have a diabetes test (fasting glucose).    At least every 5 years, have a cholesterol test.      Have a colonoscopy again in 2025.    Shots:     Get a flu shot each year.     Get a tetanus shot every 10 years.     Talk to your pharmacist about a shingles vaccine.   Nutrition:     Eat at least 5 servings of fruits and vegetables each day.     Eat whole-grain bread, whole-wheat pasta and brown rice instead of white grains and rice.     Get adequate Calcium and Vitamin D.   Lifestyle    Exercise for at least 150 minutes a week (30 minutes a day, 5 days a week). This will help you control your weight and prevent disease.     Limit alcohol to one drink per day.     No smoking.     Wear sunscreen to prevent skin cancer.    See your dentist every six months for an exam and cleaning.    See your eye doctor every 1 to 2 years to screen for conditions such as glaucoma, macular degeneration, cataracts, etc.    Personalized Prevention Plan  You are due for the preventive services outlined below.  Your care team is available to assist you in scheduling these services.  If you have already completed any of these items, please share that information with your care team to update in your medical record.  Health Maintenance Due   Topic Date Due     Zoster (Chicken Pox) Vaccine (2 of 3) 01/25/2010     FALL RISK ASSESSMENT  12/22/2018     Depression Assessment 2 - yearly  12/22/2018

## 2019-01-16 ENCOUNTER — TELEPHONE (OUTPATIENT)
Dept: PEDIATRICS | Facility: CLINIC | Age: 69
End: 2019-01-16

## 2019-01-16 DIAGNOSIS — R25.1 TREMOR: Primary | ICD-10-CM

## 2019-01-16 RX ORDER — PROPRANOLOL HYDROCHLORIDE 60 MG/1
60 TABLET ORAL DAILY
Qty: 90 TABLET | Refills: 3 | Status: SHIPPED | OUTPATIENT
Start: 2019-01-16 | End: 2020-02-05

## 2019-01-16 NOTE — TELEPHONE ENCOUNTER
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        [id number] - [place] [id number]      Phone Number Patient can be reached at: Home number on file [phone] (home)    Best Time: any    Can we leave a detailed message on this number? YES    Call taken on 1/16/2019 at 9:35 AM by Belkis Almanzar

## 2019-01-16 NOTE — TELEPHONE ENCOUNTER
Called pt back, not available, so LM to call us back. Need to know when he discontinued the med & how his tremors are(any new sx's). Is he willing to try propranolol 60 mg every day? Will await for pt's call back.     History: LOV was on 12/28/18 for px. Pt was taking Primadone 100 mg at bedtime, pt was advised to decrease dose & discontinue med last year(see below).    Notes from phone encounter from 3/29/17:  Pt called. Can decrease to 50 mg nightly for 1 week then can d/c.  Consider propranolol 60 mg daily if he wants in the future.     Dayanna, RN  Triage Nurse

## 2019-01-16 NOTE — TELEPHONE ENCOUNTER
Pt calling back. States that he has tremors through out the day, runs in his family(genetic). Tremors are the same as before, not better/worse. Denies any new sx's. He discontinued primadone a year ago.     He is willing to try propranolol 60 mg every day, if  still recommends that. Went over the common SE for propranolol. He checks his BP once in a while in [facility] closer to him. Advised him to recheck his BP 1-2 weeks after he started propranolol to monitor. Pt agrees.     Pended med, please review & sign, if appropriate. No need to notify pt if sending propranolol. If sending different drug, need to notify pt. Thanks.    Dayanna, RN  Triage Nurse

## 2019-01-22 ENCOUNTER — ALLIED HEALTH/NURSE VISIT (OUTPATIENT)
Dept: PEDIATRICS | Facility: CLINIC | Age: 69
End: 2019-01-22
Payer: COMMERCIAL

## 2019-01-22 VITALS — SYSTOLIC BLOOD PRESSURE: 116 MMHG | DIASTOLIC BLOOD PRESSURE: 68 MMHG

## 2019-01-22 DIAGNOSIS — Z01.30 BLOOD PRESSURE CHECK: Primary | ICD-10-CM

## 2019-01-22 PROCEDURE — 99207 ZZC NO CHARGE NURSE ONLY: CPT | Performed by: INTERNAL MEDICINE

## 2019-01-22 NOTE — PROGRESS NOTES
Ru Coombs is enrolled/participating in the retail pharmacy Blood Pressure Goals Achievement Program (BPGAP).  Ru Coombs was evaluated at [facility] on January 22, 2019 at which time his blood pressure was:    BP Readings from Last 3 Encounters:   01/22/19 116/68   12/28/18 122/82   12/12/18 126/72     Reviewed lifestyle modifications for blood pressure control and reduction: including making healthy food choices, managing weight, getting regular exercise, smoking cessation, reducing alcohol consumption, monitoring blood pressure regularly.     Ru Coombs is not experiencing symptoms.    Follow-Up: BP is at goal of < 150/90 mmHg (patient 60+ years of age without diabetes).  Recommended follow-up at pharmacy in 6 months.     Recommendation to Provider: patient will follow up in 1 month    Ru Coombs was evaluated for enrollment into the PGEN study today.    PLEASE INITIATE ENROLLMENT DISCUSSION WITH HTN PTS  1) Between 30-80 years old                                                                                                               2) BMI between 19-50                                                                                                        3) BP ?140/90 AND ?170/110 patients aged 30-59         BP ?150/90 AND ?170/110 non-diabetic patients aged 60-80       BP ?140/90 AND ?170/110 diabetic patients aged 60-80  4) Additional requirements for uncontrolled HTN patients:        Pt on only 1 class of medication  5) EXCLUDE patient if confirmation of:                  ? Cardiac disease                  ? Chronic Kidney Disease                  ? Pregnancy/Breastfeeding                  ? Secondary Hypertension/Pre-eclampsia                                 ? Vascular disease    This note completed by:Ariadna Shah [facility]  [phone]

## 2019-04-23 ENCOUNTER — ALLIED HEALTH/NURSE VISIT (OUTPATIENT)
Dept: PEDIATRICS | Facility: CLINIC | Age: 69
End: 2019-04-23
Payer: COMMERCIAL

## 2019-04-23 ENCOUNTER — TELEPHONE (OUTPATIENT)
Dept: PEDIATRICS | Facility: CLINIC | Age: 69
End: 2019-04-23

## 2019-04-23 VITALS — SYSTOLIC BLOOD PRESSURE: 124 MMHG | DIASTOLIC BLOOD PRESSURE: 86 MMHG

## 2019-04-23 DIAGNOSIS — M25.562 LEFT KNEE PAIN: Primary | ICD-10-CM

## 2019-04-23 DIAGNOSIS — Z01.30 BP CHECK: Primary | ICD-10-CM

## 2019-04-23 PROCEDURE — 99207 ZZC NO CHARGE NURSE ONLY: CPT | Performed by: INTERNAL MEDICINE

## 2019-04-23 NOTE — TELEPHONE ENCOUNTER
04/23/19  Pt called wanting a referral.  Pt stated when he was in for his phy he talked to dr Chaney about his knee bothering him. Pt was to call back when he was ready to get the referral.    Please call Pt @ [phone]  Ok to leave a message

## 2019-04-23 NOTE — TELEPHONE ENCOUNTER
Contacted patient who notes that about 8-10 years ago had meniscus tear in his left knee.  Notes that pain has been worsening over the last month.  Notes increased irritation with ambulation.      Denies pain at rest, swelling, redness, changes in temperature or color, numbness or tingling.    States that he has seen Dr. Ding previously and would like to follow up with him again and is requesting referral.    Message handled by Nurse Triage with [facility] - provider name: Dr. Chaney     Approval for [facility]OC orders.    Referral placed and patient will await call to schedule.  Sahra ULRICH RN - Triage  [facility]

## 2019-04-23 NOTE — PROGRESS NOTES
Ru Coombs was evaluated at [facility] on April 23, 2019 at which time his blood pressure was:    BP Readings from Last 3 Encounters:   04/23/19 124/86   01/22/19 116/68   12/28/18 122/82     Pulse Readings from Last 3 Encounters:   12/28/18 58   12/22/17 (!) 46   12/20/16 55       Reviewed lifestyle modifications for blood pressure control and reduction: including making healthy food choices, managing weight, getting regular exercise, smoking cessation, reducing alcohol consumption, monitoring blood pressure regularly.     Symptoms: None    BP Goal:< 140/90 mmHg    BP Assessment:  BP at goal    Potential Reasons for BP too high: NA - Not applicable    BP Follow-Up Plan: Recheck BP in 6 months at pharmacy    Recommendation to Provider: none    Note completed by: Hans Dunne, PharmD  [facility] Pharmacist    [facility] pharmacist on behalf of: [facility].

## 2019-05-01 ENCOUNTER — OFFICE VISIT (OUTPATIENT)
Dept: ORTHOPEDICS | Facility: CLINIC | Age: 69
End: 2019-05-01
Payer: COMMERCIAL

## 2019-05-01 ENCOUNTER — ANCILLARY PROCEDURE (OUTPATIENT)
Dept: GENERAL RADIOLOGY | Facility: CLINIC | Age: 69
End: 2019-05-01
Attending: FAMILY MEDICINE
Payer: COMMERCIAL

## 2019-05-01 VITALS
HEIGHT: 69 IN | SYSTOLIC BLOOD PRESSURE: 128 MMHG | DIASTOLIC BLOOD PRESSURE: 80 MMHG | BODY MASS INDEX: 27.4 KG/M2 | WEIGHT: 185 LBS

## 2019-05-01 DIAGNOSIS — M25.562 CHRONIC PAIN OF LEFT KNEE: Primary | ICD-10-CM

## 2019-05-01 DIAGNOSIS — M25.562 CHRONIC PAIN OF LEFT KNEE: ICD-10-CM

## 2019-05-01 DIAGNOSIS — G89.29 CHRONIC PAIN OF LEFT KNEE: ICD-10-CM

## 2019-05-01 DIAGNOSIS — M17.12 PRIMARY OSTEOARTHRITIS OF LEFT KNEE: ICD-10-CM

## 2019-05-01 DIAGNOSIS — G89.29 CHRONIC PAIN OF LEFT KNEE: Primary | ICD-10-CM

## 2019-05-01 PROCEDURE — 73562 X-RAY EXAM OF KNEE 3: CPT

## 2019-05-01 PROCEDURE — 99203 OFFICE O/P NEW LOW 30 MIN: CPT | Performed by: FAMILY MEDICINE

## 2019-05-01 ASSESSMENT — MIFFLIN-ST. JEOR: SCORE: 1594.53

## 2019-05-01 NOTE — PATIENT INSTRUCTIONS
1. Chronic pain of left knee    2. Primary osteoarthritis of left knee      Reviewed xray - advanced arthritis  Discussed options - cortisone injection, therapy for strengthening / knee stability and if pain is not well controlled referral for knee replacement  Since you're doing well and currently staying active continue to use as needed Ibuprofen  If pain prevents you from staying active then would return for ultrasound guided cortisone injection  Can also consider Physical therapy in the future    In regards to the foot - check with [facility] to see if they have a good dress boot    Follow-up as needed.

## 2019-05-01 NOTE — PROGRESS NOTES
ASSESSMENT & PLAN    1. Chronic pain of left knee    2. Primary osteoarthritis of left knee      Reviewed xray - advanced arthritis  Discussed options - cortisone injection, therapy for strengthening / knee stability and if pain is not well controlled referral for knee replacement  Currently very functional / active with as needed Ibuprofen  If pain prevents him from staying active then return for ultrasound guided cortisone injection  Can also consider Physical therapy in the future    Also asking about his feet - hx of corrective surgery for club feet. Evidence of fallen medial arch. Does well in a high / hiking boot. Recommend checking with [employer] to see if they have a good dress boot    Follow-up for US guided cortisone injection if unable to maintain activity / exercise.    -----    SUBJECTIVE  Ru Coombs is a/an 68 year old male who is seen in consultation at the request of  James Chaney M.D. for evaluation of left knee pain. The patient is seen by themselves.    Onset: 5-6 month(s) ago. Reports insidious onset without acute precipitating event.  Location of Pain: left medial knee pain  Rating of Pain at worst: 5/10  Rating of Pain Currently: 4/10  Worsened by: too much walking,  Better with: resting  Treatments tried: ibuprofen (1 tablet as needed)  Associated symptoms: random weakness/instability  Orthopedic history: left medial meniscus tear  Relevant surgical history:  left knee arthroscopy 3/25/2008 Dr. Dickson  Patient Social History: retired    Patient's past medical, surgical, social, and family histories were reviewed today and no changes are noted.    REVIEW OF SYSTEMS:  10 point ROS is negative other than symptoms noted above in HPI, Past Medical History or as stated below  Constitutional: NEGATIVE for fever, chills, change in weight  Skin: NEGATIVE for worrisome rashes, moles or lesions  GI/: NEGATIVE for bowel or bladder changes  Neuro: NEGATIVE for weakness, dizziness or  "paresthesias    OBJECTIVE:  /80   Ht 1.753 m (5' 9\")   Wt 83.9 kg (185 lb)   BMI 27.32 kg/m     General: healthy, alert and in no distress  HEENT: no scleral icterus or conjunctival erythema  Skin: no suspicious lesions or rash. No jaundice.  CV: no pedal edema  Resp: normal respiratory effort without conversational dyspnea   Psych: normal mood and affect  Gait: normal steady gait with appropriate coordination and balance  Neuro: Normal light sensory exam of lower extremity  MSK:  LEFT KNEE  Inspection:    normal alignment  Palpation:    Tender about the lateral joint line and medial joint line. Remainder of bony and ligamentous landmarks are nontender.    No effusion is present    Patellofemoral crepitus is Present  Range of Motion:     00 extension to 1200 flexion  Strength:    Extensor mechanism intact  Special Tests:    Negative: Lachman's    Independent visualization of the below image:  KNEE STANDING AP BILATERAL, SUNRISE BILATERAL, LATERAL LEFT  5/1/2019  1:58 PM      HISTORY:  Chronic pain of left knee.     COMPARISON: None.                                                                      IMPRESSION:  Three views of the left knee show no acute abnormality.  There is advanced medial joint space narrowing associated with minimal  bony spurring. Mild marginal bony spurring without significant joint  space narrowing at the patellofemoral joint. Fragmentation of the  tibial tuberosity consistent with old Osgood-Schlatter's disease. No  joint space effusion. Two views of the right knee are included and  show mild to moderate medial joint space narrowing and no acute  abnormality.     NATHANIEL MENDEZ MD    Patient's conditions were thoroughly discussed during today's visit with greater than 50% of the visit spent counseling the patient with total time spent face-to-face with the patient being 25 minutes.    Makenna Dos Santos, DO [facility]    "

## 2019-05-01 NOTE — LETTER
5/1/2019         RE: Ru Coombs  [address] [place] [address]        Dear Colleague,    Thank you [facility] your [facility]bs[facility]. Please see a copy of my visit note below.    ASSESSMENT & PLAN    1. Chronic pain of left knee    2. Primary osteoarthritis of left knee      Reviewed xray - advanced arthritis  Discussed options - cortisone injection, therapy for strengthening / knee stability and if pain is not well controlled referral for knee replacement  Currently very functional / active with as needed Ibuprofen  If pain prevents him from staying active then return for ultrasound guided cortisone injection  Can also consider Physical therapy in the future    Also asking about his feet - hx of corrective surgery for club feet. Evidence of fallen medial arch. Does well in a high / hiking boot. Recommend checking with [employer] to see if they have a good dress boot    Follow-up for US guided cortisone injection if unable to maintain activity / exercise.    -----    SUBJECTIVE  Ru Coombs is a/an 68 year old male who is seen in consultation at the request of  James Chaney M.D. for evaluation of left knee pain. The patient is seen by themselves.    Onset: 5-6 month(s) ago. Reports insidious onset without acute precipitating event.  Location of Pain: left medial knee pain  Rating of Pain at worst: 5/10  Rating of Pain Currently: 4/10  Worsened by: too much walking,  Better with: resting  Treatments tried: ibuprofen (1 tablet as needed)  Associated symptoms: random weakness/instability  Orthopedic history: left medial meniscus tear  Relevant surgical history:  left knee arthroscopy 3/25/2008 Dr. Dickson  Patient Social History: retired    Patient's past medical, surgical, social, and family histories were reviewed today and no changes are noted.    REVIEW OF SYSTEMS:  10 point ROS is negative other than symptoms noted above in HPI, Past Medical History or  "as stated below  Constitutional: NEGATIVE for fever, chills, change in weight  Skin: NEGATIVE for worrisome rashes, moles or lesions  GI/: NEGATIVE for bowel or bladder changes  Neuro: NEGATIVE for weakness, dizziness or paresthesias    OBJECTIVE:  /80   Ht 1.753 m (5' 9\")   Wt 83.9 kg (185 lb)   BMI 27.32 kg/m      General: healthy, alert and in no distress  HEENT: no scleral icterus or conjunctival erythema  Skin: no suspicious lesions or rash. No jaundice.  CV: no pedal edema  Resp: normal respiratory effort without conversational dyspnea   Psych: normal mood and affect  Gait: normal steady gait with appropriate coordination and balance  Neuro: Normal light sensory exam of lower extremity  MSK:  LEFT KNEE  Inspection:    normal alignment  Palpation:    Tender about the lateral joint line and medial joint line. Remainder of bony and ligamentous landmarks are nontender.    No effusion is present    Patellofemoral crepitus is Present  Range of Motion:     00 extension to 1200 flexion  Strength:    Extensor mechanism intact  Special Tests:    Negative: Lachman's    Independent visualization of the below image:  KNEE STANDING AP BILATERAL, SUNRISE BILATERAL, LATERAL LEFT  5/1/2019  1:58 PM      HISTORY:  Chronic pain of left knee.     COMPARISON: None.                                                                      IMPRESSION:  Three views of the left knee show no acute abnormality.  There is advanced medial joint space narrowing associated with minimal  bony spurring. Mild marginal bony spurring without significant joint  space narrowing at the patellofemoral joint. Fragmentation of the  tibial tuberosity consistent with old Osgood-Schlatter's disease. No  joint space effusion. Two views of the right knee are included and  show mild to moderate medial joint space narrowing and no acute  abnormality.     NATHANIEL MENDEZ MD    Patient's conditions were thoroughly discussed during today's visit with " greater than 50% of the visit spent counseling the patient with total time spent face-to-face with the patient being 25 minutes.    Makenna Dos Santos, DO [facility]      Again, thank you for allowing me to participate in the care of your patient.        Sincerely,        Makenna Dos Santos, DO

## 2019-06-12 ENCOUNTER — TELEPHONE (OUTPATIENT)
Dept: ORTHOPEDICS | Facility: CLINIC | Age: 69
End: 2019-06-12

## 2019-06-12 NOTE — TELEPHONE ENCOUNTER
Patient called inquiring about scheduling surgery for L-TKA.  Informed patient that I will follow up with him when our surgeon is available.  Patient would like surgery for October 2019.       Alicia Holcomb, Surgery Scheduler

## 2019-07-08 ENCOUNTER — OFFICE VISIT (OUTPATIENT)
Dept: ORTHOPEDICS | Facility: CLINIC | Age: 69
End: 2019-07-08
Payer: COMMERCIAL

## 2019-07-08 VITALS
SYSTOLIC BLOOD PRESSURE: 122 MMHG | BODY MASS INDEX: 27.4 KG/M2 | HEIGHT: 69 IN | WEIGHT: 185 LBS | DIASTOLIC BLOOD PRESSURE: 72 MMHG

## 2019-07-08 DIAGNOSIS — M17.5 OTHER SECONDARY OSTEOARTHRITIS OF LEFT KNEE: Primary | ICD-10-CM

## 2019-07-08 PROCEDURE — 99203 OFFICE O/P NEW LOW 30 MIN: CPT | Performed by: ORTHOPAEDIC SURGERY

## 2019-07-08 ASSESSMENT — MIFFLIN-ST. JEOR: SCORE: 1594.53
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
## 2019-12-10 ENCOUNTER — ALLIED HEALTH/NURSE VISIT (OUTPATIENT)
Dept: PEDIATRICS | Facility: CLINIC | Age: 69
End: 2019-12-10
Payer: COMMERCIAL

## 2019-12-10 VITALS — SYSTOLIC BLOOD PRESSURE: 136 MMHG | DIASTOLIC BLOOD PRESSURE: 68 MMHG

## 2019-12-10 DIAGNOSIS — Z01.30 BP CHECK: Primary | ICD-10-CM

## 2019-12-10 PROCEDURE — 99207 ZZC NO CHARGE NURSE ONLY: CPT | Performed by: INTERNAL MEDICINE

## 2019-12-10 NOTE — PROGRESS NOTES
Ru Coombs was evaluated at [facility] on December 10, 2019 at which time his blood pressure was:    BP Readings from Last 3 Encounters:   12/10/19 136/68   07/08/19 122/72   05/01/19 128/80     Pulse Readings from Last 3 Encounters:   12/28/18 58   12/22/17 (!) 46   12/20/16 55       Reviewed lifestyle modifications for blood pressure control and reduction: including making healthy food choices, managing weight, getting regular exercise, smoking cessation, reducing alcohol consumption, monitoring blood pressure regularly.     Symptoms: None    BP Goal:< 140/90 mmHg    BP Assessment:  BP at goal    Potential Reasons for BP too high: NA - Not applicable    BP Follow-Up Plan: Recheck BP in 6 months at pharmacy    Recommendation to Provider: Patient checks his bp periodically, no history of hypertension.    Note completed by: Ariadna Shah [facility]  [phone]

## 2019-12-31 ENCOUNTER — NURSE TRIAGE (OUTPATIENT)
Dept: NURSING | Facility: CLINIC | Age: 69
End: 2019-12-31

## 2019-12-31 NOTE — TELEPHONE ENCOUNTER
"Patient calling. States he called \"[facility]\" on Sunday for treatment of pink eye.    States he was given some eye drops. Calling today because eye is still red.    Continues to have some drainage, but it now very watery and does not appear to be pussy.    States he is having mild discomfort but denies pain. Does not wear contacts.    Offered office visit for today but patient states he would like to wait until Thursday and see how he is doing then.    Protocol and care advice reviewed  Caller states understanding of the recommended disposition    Advised to call back if further questions or concerns    Additional Information    Negative: SEVERE pain (e.g., excruciating)    Negative: Patient sounds very sick or weak to the triager    Eyelid (outer) is very red and painful (or tender to touch)    Protocols used: EYE - PUS OR [id number]      "

## 2020-02-03 DIAGNOSIS — N52.9 ERECTILE DYSFUNCTION, UNSPECIFIED ERECTILE DYSFUNCTION TYPE: ICD-10-CM

## 2020-02-03 RX ORDER — SILDENAFIL CITRATE 20 MG/1
TABLET ORAL
Qty: 30 TABLET | Refills: 0 | Status: SHIPPED | OUTPATIENT
Start: 2020-02-03 | End: 2021-10-01

## 2020-02-03 NOTE — TELEPHONE ENCOUNTER
"Requested Prescriptions   Pending Prescriptions Disp Refills     sildenafil (REVATIO) 20 MG tablet [Pharmacy Med Name: Sildenafil Citrate 20 MG Oral Tablet]  0     Sig: TAKE 2 TO 5 TABLETS BY MOUTH ONCE DAILY AS NEEDED   Last Written Prescription Date:  12/28/2018  Last Fill Quantity: 30,  # refills: 11   Last office visit: 12/28/2018 with prescribing provider   Future Office Visit:   Next 5 appointments (look out 90 days)    Feb 05, 2020 10:30 AM CST  (Arrive by 10:05 AM)  Adult physical with James Chaney MD  [facility] [address] [place] [address]  [address]  [facility] [address]  [phone]             Erectile Dysfuction Protocol Passed - 2/3/2020 10:58 AM        Passed - Absence of nitrates on medication list        Passed - Absence of Alpha Blockers on Med list        Passed - Recent (12 mo) or future (30 days) visit within the authorizing provider's specialty     Patient has had an office visit with the authorizing provider or a provider within the authorizing providers department within the previous 12 mos or has a future within next 30 days. See \"Patient Info\" tab in inbasket, or \"Choose Columns\" in Meds & Orders section of the refill encounter.              Passed - Medication is active on med list        Passed - Patient is age 18 or older        Prescription approved per [facility] Refill Protocol.  Vannesa Alex RN on 2/3/2020 at 11:49 AM    "

## 2020-02-04 ENCOUNTER — PRE VISIT (OUTPATIENT)
Dept: PEDIATRICS | Facility: CLINIC | Age: 70
End: 2020-02-04

## 2020-02-04 ENCOUNTER — TELEPHONE (OUTPATIENT)
Dept: PEDIATRICS | Facility: CLINIC | Age: 70
End: 2020-02-04

## 2020-02-04 NOTE — TELEPHONE ENCOUNTER
Called for pvp. No answer. Left message for patient.    Georges Harding on 2/4/2020 at 11:18 AM  [facility] - Clinic Health Guide  [email or web address]  [phone]

## 2020-02-04 NOTE — PROGRESS NOTES
Pre-Visit Planning     Future Appointments   Date Time Provider Department Center   2/5/2020 10:30 AM James Chaney MD [facility]     Arrival Time for this Appointment: 10:05 AM     Unable to reach. Left voicemail. Advised patient to call clinic back at [phone].    Georges Harding on 2/4/2020 at 11:15 AM  [facility] - Clinic Health Guide  [email or web address]  [phone]

## 2020-02-04 NOTE — TELEPHONE ENCOUNTER
Arcadio Harding on 2/4/2020 at 11:25 AM  [facility] - Clinic Health Guide  [email or web address]  [phone]

## 2020-02-05 ENCOUNTER — OFFICE VISIT (OUTPATIENT)
Dept: PEDIATRICS | Facility: CLINIC | Age: 70
End: 2020-02-05
Payer: COMMERCIAL

## 2020-02-05 VITALS
OXYGEN SATURATION: 96 % | TEMPERATURE: 98.4 F | WEIGHT: 194 LBS | SYSTOLIC BLOOD PRESSURE: 128 MMHG | DIASTOLIC BLOOD PRESSURE: 86 MMHG | HEART RATE: 50 BPM | RESPIRATION RATE: 18 BRPM | HEIGHT: 69 IN | BODY MASS INDEX: 28.73 KG/M2

## 2020-02-05 DIAGNOSIS — R25.1 TREMOR: ICD-10-CM

## 2020-02-05 DIAGNOSIS — Z00.00 ENCOUNTER FOR MEDICARE ANNUAL WELLNESS EXAM: Primary | ICD-10-CM

## 2020-02-05 PROCEDURE — 80048 BASIC METABOLIC PNL TOTAL CA: CPT | Performed by: INTERNAL MEDICINE

## 2020-02-05 PROCEDURE — 99397 PER PM REEVAL EST PAT 65+ YR: CPT | Performed by: INTERNAL MEDICINE

## 2020-02-05 PROCEDURE — 80061 LIPID PANEL: CPT | Performed by: INTERNAL MEDICINE

## 2020-02-05 PROCEDURE — 36415 COLL VENOUS BLD VENIPUNCTURE: CPT | Performed by: INTERNAL MEDICINE

## 2020-02-05 RX ORDER — PROPRANOLOL HYDROCHLORIDE 60 MG/1
60 TABLET ORAL DAILY
Qty: 90 TABLET | Refills: 3 | Status: SHIPPED | OUTPATIENT
Start: 2020-02-05 | End: 2021-01-26

## 2020-02-05 SDOH — ECONOMIC STABILITY: INCOME INSECURITY: HOW HARD IS IT FOR YOU TO PAY FOR THE VERY BASICS LIKE FOOD, HOUSING, MEDICAL CARE, AND HEATING?: NOT HARD AT ALL

## 2020-02-05 SDOH — HEALTH STABILITY: MENTAL HEALTH: HOW OFTEN DO YOU HAVE 6 OR MORE DRINKS ON ONE OCCASION?: NEVER

## 2020-02-05 SDOH — SOCIAL STABILITY: SOCIAL NETWORK: HOW OFTEN DO YOU ATTENT MEETINGS OF THE CLUB OR ORGANIZATION YOU BELONG TO?: MORE THAN 4 TIMES PER YEAR

## 2020-02-05 SDOH — ECONOMIC STABILITY: FOOD INSECURITY: WITHIN THE PAST 12 MONTHS, THE FOOD YOU BOUGHT JUST DIDN'T LAST AND YOU DIDN'T HAVE MONEY TO GET MORE.: NEVER TRUE

## 2020-02-05 SDOH — SOCIAL STABILITY: SOCIAL NETWORK: HOW OFTEN DO YOU GET TOGETHER WITH FRIENDS OR RELATIVES?: ONCE A WEEK

## 2020-02-05 SDOH — SOCIAL STABILITY: SOCIAL NETWORK: HOW OFTEN DO YOU ATTEND CHURCH OR RELIGIOUS SERVICES?: MORE THAN 4 TIMES PER YEAR

## 2020-02-05 SDOH — HEALTH STABILITY: MENTAL HEALTH: HOW MANY STANDARD DRINKS CONTAINING ALCOHOL DO YOU HAVE ON A TYPICAL DAY?: 1 OR 2

## 2020-02-05 SDOH — SOCIAL STABILITY: SOCIAL NETWORK
DO YOU BELONG TO ANY CLUBS OR ORGANIZATIONS SUCH AS CHURCH GROUPS UNIONS, FRATERNAL OR ATHLETIC GROUPS, OR SCHOOL GROUPS?: YES

## 2020-02-05 SDOH — SOCIAL STABILITY: SOCIAL NETWORK: ARE YOU MARRIED, WIDOWED, DIVORCED, SEPARATED, NEVER MARRIED, OR LIVING WITH A PARTNER?: MARRIED

## 2020-02-05 SDOH — HEALTH STABILITY: MENTAL HEALTH
STRESS IS WHEN SOMEONE FEELS TENSE, NERVOUS, ANXIOUS, OR CAN'T SLEEP AT NIGHT BECAUSE THEIR MIND IS TROUBLED. HOW STRESSED ARE YOU?: NOT AT ALL

## 2020-02-05 SDOH — ECONOMIC STABILITY: FOOD INSECURITY: WITHIN THE PAST 12 MONTHS, YOU WORRIED THAT YOUR FOOD WOULD RUN OUT BEFORE YOU GOT MONEY TO BUY MORE.: NEVER TRUE

## 2020-02-05 SDOH — ECONOMIC STABILITY: TRANSPORTATION INSECURITY
IN THE PAST 12 MONTHS, HAS LACK OF TRANSPORTATION KEPT YOU FROM MEETINGS, WORK, OR FROM GETTING THINGS NEEDED FOR DAILY LIVING?: NO

## 2020-02-05 SDOH — HEALTH STABILITY: PHYSICAL HEALTH: ON AVERAGE, HOW MANY MINUTES DO YOU ENGAGE IN EXERCISE AT THIS LEVEL?: 40 MIN

## 2020-02-05 SDOH — ECONOMIC STABILITY: TRANSPORTATION INSECURITY
IN THE PAST 12 MONTHS, HAS THE LACK OF TRANSPORTATION KEPT YOU FROM MEDICAL APPOINTMENTS OR FROM GETTING MEDICATIONS?: NO

## 2020-02-05 SDOH — HEALTH STABILITY: MENTAL HEALTH: HOW OFTEN DO YOU HAVE A DRINK CONTAINING ALCOHOL?: 4 OR MORE TIMES A WEEK

## 2020-02-05 SDOH — HEALTH STABILITY: PHYSICAL HEALTH: ON AVERAGE, HOW MANY DAYS PER WEEK DO YOU ENGAGE IN MODERATE TO STRENUOUS EXERCISE (LIKE A BRISK WALK)?: 7 DAYS

## 2020-02-05 SDOH — SOCIAL STABILITY: SOCIAL NETWORK: IN A TYPICAL WEEK, HOW MANY TIMES DO YOU TALK ON THE PHONE WITH FAMILY, FRIENDS, OR NEIGHBORS?: ONCE A WEEK

## 2020-02-05 ASSESSMENT — ENCOUNTER SYMPTOMS
PALPITATIONS: 0
SHORTNESS OF BREATH: 1
ARTHRALGIAS: 0
NERVOUS/ANXIOUS: 0
ABDOMINAL PAIN: 0
HEMATURIA: 0
SORE THROAT: 0
COUGH: 0
HEMATOCHEZIA: 0
FEVER: 0
DIARRHEA: 0
HEADACHES: 0
JOINT SWELLING: 0
EYE PAIN: 0
MYALGIAS: 0
DYSURIA: 0
PARESTHESIAS: 0
WEAKNESS: 0
CONSTIPATION: 0
NAUSEA: 0
HEARTBURN: 0
DIZZINESS: 0
CHILLS: 0
FREQUENCY: 0

## 2020-02-05 ASSESSMENT — MIFFLIN-ST. JEOR: SCORE: 1631.85

## 2020-02-05 ASSESSMENT — ACTIVITIES OF DAILY LIVING (ADL): CURRENT_FUNCTION: NO ASSISTANCE NEEDED

## 2020-02-05 NOTE — PROGRESS NOTES
"SUBJECTIVE:   Ru Coombs is a 69 year old male who presents for Preventive Visit.      Are you in the first 12 months of your Medicare coverage?  No    Healthy Habits:     In general, how would you rate your overall health?  Good    Frequency of exercise:  6-7 days/week    Duration of exercise:  45-60 minutes    Do you usually eat at least 4 servings of fruit and vegetables a day, include whole grains    & fiber and avoid regularly eating high fat or \"junk\" foods?  Yes    Taking medications regularly:  Yes    Medication side effects:  None    Ability to successfully perform activities of daily living:  No assistance needed    Home Safety:  No safety concerns identified    Hearing Impairment:  Feel that people are mumbling or not speaking clearly    In the past 6 months, have you been bothered by leaking of urine?  No    In general, how would you rate your overall mental or emotional health?  Good      PHQ-2 Total Score: 0    Additional concerns today:  No    Do you feel safe in your environment? Yes    Have you ever done Advance Care Planning? (For example, a Health Directive, POLST, or a discussion with a medical provider or your loved ones about your wishes): Yes, advance care planning is on file.      Clearing throat constantly, forming a lot of mucus, sinus stuff.    Hearing concerns, from wife, maybe do a hearing test.    HEARING FREQUENCY    Right Ear:      1000 Hz RESPONSE- on Level: 40 db (Conditioning sound)   1000 Hz: RESPONSE- on Level:   20 db    2000 Hz: RESPONSE- on Level:   20 db    4000 Hz: RESPONSE- on Level: tone not heard    Left Ear:      4000 Hz: RESPONSE- on Level: tone not heard   2000 Hz: RESPONSE- on Level:   20 db    1000 Hz: RESPONSE- on Level:   20 db     500 Hz: RESPONSE- on Level: 25 db    Right Ear:    500 Hz: RESPONSE- on Level: 25 db    Hearing Acuity: REFER      Took Mucinex for a time, seemed to help  Uses Afrin at bedtime a few times per week.  Not using nasal " steroid.    Tremor. Chronic. Helped by propranolol.      Fall risk  Fallen 2 or more times in the past year?: No  Any fall with injury in the past year?: No    Cognitive Screening   1) Repeat 3 items (Leader, Season, Table)    2) Clock draw: NORMAL  3) 3 item recall: Recalls 3 objects  Results: 3 items recalled: COGNITIVE IMPAIRMENT LESS LIKELY    Mini-CogTM Copyright LARRY Davidson. Licensed by the author for use in [facility]; reprinted with permission (evob@[email or web address]). All rights reserved.      Do you have sleep apnea, excessive snoring or daytime drowsiness?: no    Reviewed and updated as needed this visit by Provider  Tobacco  Allergies  Meds  Problems  Med Hx  Surg Hx  Fam Hx        Social History     Tobacco Use     Smoking status: Former Smoker     Packs/day: 3.00     Years: 10.00     Pack years: 30.00     Last attempt to quit: 1988     Years since quittin.1     Smokeless tobacco: Never Used     Tobacco comment: quit    Substance Use Topics     Alcohol use: Yes     Alcohol/week: 0.0 standard drinks     Frequency: 4 or more times a week     Drinks per session: 1 or 2     Binge frequency: Never     Comment: No more than 3 drinks per week      If you drink alcohol do you typically have >3 drinks per day or >7 drinks per week? No    Alcohol Use 2020   Prescreen: >3 drinks/day or >7 drinks/week? No   Prescreen: >3 drinks/day or >7 drinks/week? -       Current providers sharing in care for this patient include:   Patient Care Team:  James Chaney MD as PCP - General  James Chaney MD as Assigned PCP  Georges Pace as Personal Advocate & Liaison (PAL)    The following health maintenance items are reviewed in Epic and correct as of today:  Health Maintenance   Topic Date Due     ANNUAL REVIEW OF HM ORDERS  1950     ZOSTER IMMUNIZATION (2 of 3) 2010     MEDICARE ANNUAL WELLNESS VISIT  2019     FALL RISK ASSESSMENT  2019     PHQ-2  2020      "ADVANCE CARE PLANNING  12/15/2020     LIPID  12/28/2023     COLONOSCOPY  01/13/2025     DTAP/TDAP/TD IMMUNIZATION (3 - Td) 01/05/2026     HEPATITIS C SCREENING  Completed     INFLUENZA VACCINE  Completed     PNEUMOCOCCAL IMMUNIZATION 65+ LOW/MEDIUM RISK  Completed     AORTIC ANEURYSM SCREENING (SYSTEM ASSIGNED)  Completed     IPV IMMUNIZATION  Aged Out     MENINGITIS IMMUNIZATION  Aged Out     Review of Systems   Constitutional: Negative for chills and fever.   HENT: Positive for congestion. Negative for ear pain, hearing loss and sore throat.    Eyes: Negative for pain and visual disturbance.   Respiratory: Positive for shortness of breath. Negative for cough.    Cardiovascular: Negative for chest pain, palpitations and peripheral edema.   Gastrointestinal: Negative for abdominal pain, constipation, diarrhea, heartburn, hematochezia and nausea.   Genitourinary: Positive for impotence. Negative for discharge, dysuria, frequency, genital sores, hematuria and urgency.   Musculoskeletal: Negative for arthralgias, joint swelling and myalgias.   Skin: Negative for rash.   Neurological: Negative for dizziness, weakness, headaches and paresthesias.   Psychiatric/Behavioral: Negative for mood changes. The patient is not nervous/anxious.        OBJECTIVE:   /86 (BP Location: Right arm, Patient Position: Sitting, Cuff Size: Adult Regular)   Pulse 50   Temp 98.4  F (36.9  C) (Tympanic)   Resp 18   Ht 1.755 m (5' 9.09\")   Wt 88 kg (194 lb)   SpO2 96%   BMI 28.57 kg/m   Estimated body mass index is 28.57 kg/m  as calculated from the following:    Height as of this encounter: 1.755 m (5' 9.09\").    Weight as of this encounter: 88 kg (194 lb).  Physical Exam  GENERAL: healthy, alert and no distress  EYES: Eyes grossly normal to inspection, PERRL and conjunctivae and sclerae normal  HENT: ear canals and TM's normal, nose and mouth without ulcers or lesions  NECK: no adenopathy, no asymmetry, masses, or scars and " "thyroid normal to palpation  RESP: lungs clear to auscultation - no rales, rhonchi or wheezes  CV: regular rate and rhythm, normal S1 S2, no S3 or S4, no murmur, click or rub, no peripheral edema and peripheral pulses strong  ABDOMEN: soft, nontender, no hepatosplenomegaly, no masses and bowel sounds normal  MS: no gross musculoskeletal defects noted, no edema  SKIN: no suspicious lesions or rashes. Vascular lesion just to the left of the nasal bridge, chronic per pt.  NEURO: Normal strength and tone, mentation intact and speech normal  PSYCH: mentation appears normal, affect normal/bright        ASSESSMENT / PLAN:       ICD-10-CM    1. Encounter for Medicare annual wellness exam Z00.00 Lipid Profile     Basic metabolic panel   2. Tremor R25.1 propranolol (INDERAL) 60 MG tablet     - Formal audiology recommended    COUNSELING:  Reviewed preventive health counseling, as reflected in patient instructions    Estimated body mass index is 28.57 kg/m  as calculated from the following:    Height as of this encounter: 1.755 m (5' 9.09\").    Weight as of this encounter: 88 kg (194 lb).         reports that he quit smoking about 32 years ago. He has a 30.00 pack-year smoking history. He has never used smokeless tobacco.      Appropriate preventive services were discussed with this patient, including applicable screening as appropriate for cardiovascular disease, diabetes, osteopenia/osteoporosis, and glaucoma.  As appropriate for age/gender, discussed screening for colorectal cancer, prostate cancer, breast cancer, and cervical cancer. Checklist reviewing preventive services available has been given to the patient.    Reviewed patients plan of care and provided an AVS. The Basic Care Plan (routine screening as documented in Health Maintenance) for Ru meets the Care Plan requirement. This Care Plan has been established and reviewed with the Patient.    Counseling Resources:  ATP IV Guidelines  Pooled Cohorts Equation " Calculator  Breast Cancer Risk Calculator  FRAX Risk Assessment  ICSI Preventive Guidelines  Dietary Guidelines for Americans, 2010  [employer]'s MyPlate  ASA Prophylaxis  Lung CA Screening    James Chaney MD  [facility]    Identified Health Risks:

## 2020-02-05 NOTE — LETTER
[facility]  [address] [place] [id number]                  [phone]   February 6, 2020    Ru Coombs  [address] [place] [address]        Ru:     Your tests are all complete. The results show:     1. Your metabolic panel, including kidney function, glucose (blood sugar) and electrolytes, is normal.     2. Your cholesterol profile shows good results.     Your Lipid Goals:   Cholesterol: Desirable is less than 200, Borderline is 200-240   HDL (Good Cholesterol): Desirable is greater than 40   LDL (Bad Cholesterol): Desirable is less than 130, Borderline 130-160   Triglycerides (Fats in the Blood): Desirable is less than 150,  Borderline is 150-200     It was nice to see you!     Please feel free to contact me if you have any questions or concerns.       James Chaney         Results for orders placed or performed in visit on 02/05/20   Lipid Profile     Status: Abnormal   Result Value Ref Range    Cholesterol 194 <200 mg/dL    Triglycerides 59 <150 mg/dL    HDL Cholesterol 54 >39 mg/dL    LDL Cholesterol Calculated 128 (H) <100 mg/dL    Non HDL Cholesterol 140 (H) <130 mg/dL   Basic metabolic panel     Status: None   Result Value Ref Range    Sodium 138 133 - 144 mmol/L    Potassium 4.3 3.4 - 5.3 mmol/L    Chloride 108 94 - 109 mmol/L    Carbon Dioxide 25 20 - 32 mmol/L    Anion Gap 5 3 - 14 mmol/L    Glucose 97 70 - 99 mg/dL    Urea Nitrogen 16 7 - 30 mg/dL    Creatinine 0.78 0.66 - 1.25 mg/dL    GFR Estimate >90 >60 mL/min/[1.73_m2]    GFR Estimate If Black >90 >60 mL/min/[1.73_m2]    Calcium 9.1 8.5 - 10.1 mg/dL

## 2020-02-05 NOTE — PATIENT INSTRUCTIONS
Patient Education   Personalized Prevention Plan  You are due for the preventive services outlined below.  Your care team is available to assist you in scheduling these services.  If you have already completed any of these items, please share that information with your care team to update in your medical record.  Health Maintenance Due   Topic Date Due     Zoster (Shingles) Vaccine (2 of 3) 01/25/2010

## 2020-02-06 LAB
ANION GAP SERPL CALCULATED.3IONS-SCNC: 5 MMOL/L (ref 3–14)
BUN SERPL-MCNC: 16 MG/DL (ref 7–30)
CALCIUM SERPL-MCNC: 9.1 MG/DL (ref 8.5–10.1)
CHLORIDE SERPL-SCNC: 108 MMOL/L (ref 94–109)
CHOLEST SERPL-MCNC: 194 MG/DL
CO2 SERPL-SCNC: 25 MMOL/L (ref 20–32)
CREAT SERPL-MCNC: 0.78 MG/DL (ref 0.66–1.25)
GFR SERPL CREATININE-BSD FRML MDRD: >90 ML/MIN/{1.73_M2}
GLUCOSE SERPL-MCNC: 97 MG/DL (ref 70–99)
HDLC SERPL-MCNC: 54 MG/DL
LDLC SERPL CALC-MCNC: 128 MG/DL
NONHDLC SERPL-MCNC: 140 MG/DL
POTASSIUM SERPL-SCNC: 4.3 MMOL/L (ref 3.4–5.3)
SODIUM SERPL-SCNC: 138 MMOL/L (ref 133–144)
TRIGL SERPL-MCNC: 59 MG/DL

## 2021-01-26 ENCOUNTER — VIRTUAL VISIT (OUTPATIENT)
Dept: PEDIATRICS | Facility: CLINIC | Age: 71
End: 2021-01-26
Payer: COMMERCIAL

## 2021-01-26 DIAGNOSIS — R25.1 TREMOR: ICD-10-CM

## 2021-01-26 DIAGNOSIS — R06.02 SHORTNESS OF BREATH: Primary | ICD-10-CM

## 2021-01-26 PROCEDURE — 99213 OFFICE O/P EST LOW 20 MIN: CPT | Mod: TEL | Performed by: INTERNAL MEDICINE

## 2021-01-26 RX ORDER — PROPRANOLOL HYDROCHLORIDE 60 MG/1
60 TABLET ORAL DAILY
Qty: 90 TABLET | Refills: 3 | Status: SHIPPED | OUTPATIENT
Start: 2021-01-26 | End: 2021-04-12

## 2021-01-26 RX ORDER — ALBUTEROL SULFATE 90 UG/1
2 AEROSOL, METERED RESPIRATORY (INHALATION) EVERY 4 HOURS PRN
Qty: 1 INHALER | Refills: 1 | Status: SHIPPED | OUTPATIENT
Start: 2021-01-26 | End: 2023-11-29

## 2021-01-26 NOTE — PROGRESS NOTES
Ru is a 70 year old who is being evaluated via a billable telephone visit.        ICD-10-CM    1. Shortness of breath  R06.02 albuterol (PROAIR HFA/PROVENTIL HFA/VENTOLIN HFA) 108 (90 Base) MCG/ACT inhaler   2. Tremor  R25.1 propranolol (INDERAL) 60 MG tablet     Possible asthma vs mild COPD. Will try albuterol prn. Call if this is not helpful.   Refilled propranolol for the next year.    James Chaney MD      Subjective       HPI     Patient would like to request an inhaler for asthma. Asthma not on problem list and hasn't had inhaler before.   Has been noting shortness of breath especially when outdoors.   Hx of smoking. No prior dx of asthma.    Tremor. Treated with propranolol.  BP was checked at a dental visit. 116/62 at that visit.   BP Readings from Last 3 Encounters:   02/05/20 128/86   12/10/19 136/68   07/08/19 122/72             Objective          Vitals:  No vitals were obtained today due to virtual visit.    Physical Exam     GEN: No distress  PSYCH: Alert, affect is normal  RESP: No cough, no audible wheezing, able to talk in full sentences  Remainder of exam unable to be completed due to telephone visit      Phone call duration: 9 minutes

## 2021-04-09 DIAGNOSIS — R25.1 TREMOR: ICD-10-CM

## 2021-04-12 RX ORDER — PROPRANOLOL HYDROCHLORIDE 60 MG/1
TABLET ORAL
Qty: 90 TABLET | Refills: 3 | Status: SHIPPED | OUTPATIENT
Start: 2021-04-12 | End: 2022-06-29

## 2021-05-17 ENCOUNTER — OFFICE VISIT (OUTPATIENT)
Dept: PEDIATRICS | Facility: CLINIC | Age: 71
End: 2021-05-17
Payer: COMMERCIAL

## 2021-05-17 VITALS
TEMPERATURE: 97 F | WEIGHT: 190.2 LBS | BODY MASS INDEX: 28.17 KG/M2 | SYSTOLIC BLOOD PRESSURE: 124 MMHG | HEART RATE: 60 BPM | HEIGHT: 69 IN | RESPIRATION RATE: 16 BRPM | DIASTOLIC BLOOD PRESSURE: 76 MMHG

## 2021-05-17 DIAGNOSIS — Z00.00 ENCOUNTER FOR MEDICARE ANNUAL WELLNESS EXAM: Primary | ICD-10-CM

## 2021-05-17 DIAGNOSIS — Z12.5 SCREENING FOR PROSTATE CANCER: ICD-10-CM

## 2021-05-17 LAB
ANION GAP SERPL CALCULATED.3IONS-SCNC: 4 MMOL/L (ref 3–14)
BUN SERPL-MCNC: 22 MG/DL (ref 7–30)
CALCIUM SERPL-MCNC: 8.8 MG/DL (ref 8.5–10.1)
CHLORIDE SERPL-SCNC: 108 MMOL/L (ref 94–109)
CHOLEST SERPL-MCNC: 169 MG/DL
CO2 SERPL-SCNC: 26 MMOL/L (ref 20–32)
CREAT SERPL-MCNC: 0.75 MG/DL (ref 0.66–1.25)
GFR SERPL CREATININE-BSD FRML MDRD: >90 ML/MIN/{1.73_M2}
GLUCOSE SERPL-MCNC: 99 MG/DL (ref 70–99)
HDLC SERPL-MCNC: 54 MG/DL
LDLC SERPL CALC-MCNC: 103 MG/DL
NONHDLC SERPL-MCNC: 115 MG/DL
POTASSIUM SERPL-SCNC: 3.9 MMOL/L (ref 3.4–5.3)
PSA SERPL-ACNC: 3.2 UG/L (ref 0–4)
SODIUM SERPL-SCNC: 139 MMOL/L (ref 133–144)
TRIGL SERPL-MCNC: 62 MG/DL

## 2021-05-17 PROCEDURE — 80061 LIPID PANEL: CPT | Performed by: INTERNAL MEDICINE

## 2021-05-17 PROCEDURE — 99397 PER PM REEVAL EST PAT 65+ YR: CPT | Performed by: INTERNAL MEDICINE

## 2021-05-17 PROCEDURE — G0103 PSA SCREENING: HCPCS | Performed by: INTERNAL MEDICINE

## 2021-05-17 PROCEDURE — 36415 COLL VENOUS BLD VENIPUNCTURE: CPT | Performed by: INTERNAL MEDICINE

## 2021-05-17 PROCEDURE — 80048 BASIC METABOLIC PNL TOTAL CA: CPT | Performed by: INTERNAL MEDICINE

## 2021-05-17 RX ORDER — TURMERIC 400 MG
720 CAPSULE ORAL DAILY
COMMUNITY
Start: 2021-05-17

## 2021-05-17 ASSESSMENT — ACTIVITIES OF DAILY LIVING (ADL): CURRENT_FUNCTION: NO ASSISTANCE NEEDED

## 2021-05-17 ASSESSMENT — MIFFLIN-ST. JEOR: SCORE: 1608.12

## 2021-05-17 NOTE — PATIENT INSTRUCTIONS
Patient Education   Personalized Prevention Plan  You are due for the preventive services outlined below.  Your care team is available to assist you in scheduling these services.  If you have already completed any of these items, please share that information with your care team to update in your medical record.  Health Maintenance Due   Topic Date Due     ANNUAL REVIEW OF HM ORDERS  Never done     Annual Wellness Visit  02/05/2021     FALL RISK ASSESSMENT  02/05/2021

## 2021-05-17 NOTE — PROGRESS NOTES
"SUBJECTIVE:   Ru Coombs is a 70 year old male who presents for Preventive Visit.    Patient has been advised of split billing requirements and indicates understanding: Yes   Are you in the first 12 months of your Medicare coverage?  No    Healthy Habits:    In general, how would you rate your overall health?  Very good    Frequency of exercise:  6-7 days/week    Duration of exercise:  Greater than 60 minutes    Do you usually eat at least 4 servings of fruit and vegetables a day, include whole grains    & fiber and avoid regularly eating high fat or \"junk\" foods?  Yes    Taking medications regularly:  Yes    Barriers to taking medications:  None    Medication side effects:  None    Ability to successfully perform activities of daily living:  No assistance needed    Home Safety:  No safety concerns identified    Hearing Impairment:  No hearing concerns    In the past 6 months, have you been bothered by leaking of urine?  No    In general, how would you rate your overall mental or emotional health?  Very good      PHQ-2 Total Score:    Additional concerns today:  Yes    Do you feel safe in your environment? Yes    Have you ever done Advance Care Planning? (For example, a Health Directive, POLST, or a discussion with a medical provider or your loved ones about your wishes): Yes, patient states has an Advance Care Planning document and will bring a copy to the clinic.    Evaluated for shortness of breath in January.  Sx are improved but still notes some SOB at times.  Worse w/ exertion - is now able to ride bike 5-10 miles w/o difficulty.  Early this year, out of breath with 1 flight of stairs. Much  Better now.  Using albuterol intermittently.   Had been much less active last year d/t pandemic.  Reviewed prior imaging. Normal CXR 2011 and 2016.   Coronary CT in 2017, mentioned that lungs appear normal. 60%ile for coronary calcium       Chronic tremor. Taking 1/2 tab of propranolol daily, working fairly well. " "    Fall risk  Fallen 2 or more times in the past year?: No  Any fall with injury in the past year?: No    Cognitive Screening   1) Repeat 3 items (Leader, Season, Table)    2) Clock draw: NORMAL  3) 3 item recall: Recalls 3 objects  Results: 3 items recalled: COGNITIVE IMPAIRMENT LESS LIKELY    Mini-CogTM Copyright S Lety. Licensed by the author for use in [facility]; reprinted with permission ([email or web address]). All rights reserved.      Do you have sleep apnea, excessive snoring or daytime drowsiness?: no     Social History     Tobacco Use     Smoking status: Former Smoker     Packs/day: 3.00     Years: 10.00     Pack years: 30.00     Quit date: 1988     Years since quittin.3     Smokeless tobacco: Never Used     Tobacco comment: quit    Substance Use Topics     Alcohol use: Yes     Alcohol/week: 0.0 standard drinks     Frequency: 4 or more times a week     Drinks per session: 1 or 2     Binge frequency: Never     Comment: No more than 3 drinks per week          Alcohol Use 2020   Prescreen: >3 drinks/day or >7 drinks/week? No   Prescreen: >3 drinks/day or >7 drinks/week? -       Current providers sharing in care for this patient include:   Patient Care Team:  James Chaney MD as PCP - General  James Chaney MD as Assigned PCP  Georges Pace as Personal Advocate & Liaison (PAL)    The following health maintenance items are reviewed in Epic and correct as of today:  Health Maintenance Due   Topic Date Due     ANNUAL REVIEW OF HM ORDERS  Never done     FALL RISK ASSESSMENT  2021           OBJECTIVE:   /76 (BP Location: Right arm, Patient Position: Sitting, Cuff Size: Adult Regular)   Pulse 60   Temp 97  F (36.1  C) (Tympanic)   Resp 16   Ht 1.753 m (5' 9\")   Wt 86.3 kg (190 lb 3.2 oz)   BMI 28.09 kg/m   Estimated body mass index is 28.09 kg/m  as calculated from the following:    Height as of this encounter: 1.753 m (5' 9\").    Weight as of this " "encounter: 86.3 kg (190 lb 3.2 oz).  Physical Exam  GENERAL: healthy, alert and no distress  EYES: Eyes grossly normal to inspection, PERRL and conjunctivae and sclerae normal  HENT: ear canals and TM's normal  NECK: no adenopathy, no asymmetry, masses, or scars and thyroid normal to palpation  RESP: lungs clear to auscultation - no rales, rhonchi or wheezes  CV: regular rate and rhythm, normal S1 S2, no murmur, no peripheral edema and peripheral pulses strong  ABDOMEN: soft, nontender, no masses and bowel sounds normal  MS: no gross musculoskeletal defects noted, no edema  SKIN: no suspicious lesions or rashes  NEURO: Normal strength and tone, mentation intact and speech normal  PSYCH: mentation appears normal, affect normal/bright    ASSESSMENT / PLAN:       ICD-10-CM    1. Encounter for Medicare annual wellness exam  Z00.00 Lipid panel reflex to direct LDL Fasting     Basic metabolic panel   2. Screening for prostate cancer  Z12.5 Prostate spec antigen screen         COUNSELING:  Reviewed preventive health counseling, as reflected in patient instructions    Estimated body mass index is 28.09 kg/m  as calculated from the following:    Height as of this encounter: 1.753 m (5' 9\").    Weight as of this encounter: 86.3 kg (190 lb 3.2 oz).        He reports that he quit smoking about 33 years ago. He has a 30.00 pack-year smoking history. He has never used smokeless tobacco.      Appropriate preventive services were discussed with this patient, including applicable screening as appropriate for cardiovascular disease, diabetes, osteopenia/osteoporosis, and glaucoma.  As appropriate for age/gender, discussed screening for colorectal cancer, prostate cancer, breast cancer, and cervical cancer. Checklist reviewing preventive services available has been given to the patient.    Reviewed patients plan of care and provided an AVS. The Basic Care Plan (routine screening as documented in Health Maintenance) for Ru meets the " Care Plan requirement. This Care Plan has been established and reviewed with the Patient.    Counseling Resources:  ATP IV Guidelines  Pooled Cohorts Equation Calculator  Breast Cancer Risk Calculator  Breast Cancer: Medication to Reduce Risk  FRAX Risk Assessment  ICSI Preventive Guidelines  Dietary Guidelines for Americans, 2010  USDA's MyPlate  ASA Prophylaxis  Lung CA Screening    James Chaney MD  [facility]    Identified Health Risks:

## 2021-05-17 NOTE — LETTER
May 18, 2021      uR Coombs  [address] [place] [address]        Ru:     Your tests are all complete. The results show:     1. Your metabolic panel, including kidney function, glucose (blood sugar) and electrolytes, is normal.     2.your cholesterol profile shows good results.     Your Lipid Goals:   Cholesterol: Desirable is less than 200, Borderline is 200-240   HDL (Good Cholesterol): Desirable is greater than 40   LDL (Bad Cholesterol): Desirable is less than 130, Borderline 130-160   Triglycerides (Fats in the Blood): Desirable is less than 150,  Borderline is 150-200     3. Your PSA (prostate level) is in the normal range. Your level has been slowly increasing over the past few years. Nothing additional needs to be done at this time, but we should plan to recheck a PSA in about 1 year.       Please feel free to contact me if you have any questions or concerns.       James Chaney    Resulted Orders   Lipid panel reflex to direct LDL Fasting   Result Value Ref Range    Cholesterol 169 <200 mg/dL    Triglycerides 62 <150 mg/dL    HDL Cholesterol 54 >39 mg/dL    LDL Cholesterol Calculated 103 (H) <100 mg/dL      Comment:      Above desirable:  100-129 mg/dl  Borderline High:  130-159 mg/dL  High:             160-189 mg/dL  Very high:       >189 mg/dl      Non HDL Cholesterol 115 <130 mg/dL   Basic metabolic panel   Result Value Ref Range    Sodium 139 133 - 144 mmol/L    Potassium 3.9 3.4 - 5.3 mmol/L    Chloride 108 94 - 109 mmol/L    Carbon Dioxide 26 20 - 32 mmol/L    Anion Gap 4 3 - 14 mmol/L    Glucose 99 70 - 99 mg/dL    Urea Nitrogen 22 7 - 30 mg/dL    Creatinine 0.75 0.66 - 1.25 mg/dL    GFR Estimate >90 >60 mL/min/[1.73_m2]      Comment:      Non  GFR Calc  Starting 12/18/2018, serum creatinine based estimated GFR (eGFR) will be   calculated using the Chronic Kidney Disease Epidemiology Collaboration   (CKD-EPI) equation.      GFR Estimate If Black >90 >60 mL/min/[1.73_m2]       Comment:       GFR Calc  Starting 12/18/2018, serum creatinine based estimated GFR (eGFR) will be   calculated using the Chronic Kidney Disease Epidemiology Collaboration   (CKD-EPI) equation.      Calcium 8.8 8.5 - 10.1 mg/dL   Prostate spec antigen screen   Result Value Ref Range    PSA 3.20 0 - 4 ug/L      Comment:      Assay Method:  Chemiluminescence using Siemens Vista analyzer

## 2021-08-30 ENCOUNTER — TELEPHONE (OUTPATIENT)
Dept: PEDIATRICS | Facility: CLINIC | Age: 71
End: 2021-08-30

## 2021-08-30 DIAGNOSIS — M21.42 FALLEN ARCHES: Primary | ICD-10-CM

## 2021-08-30 DIAGNOSIS — M21.41 FALLEN ARCHES: Primary | ICD-10-CM

## 2021-08-30 NOTE — TELEPHONE ENCOUNTER
Please call pt-  I entered an orthotics referral. He should call them at [phone] to schedule a visit.

## 2021-08-30 NOTE — TELEPHONE ENCOUNTER
Patient calling requesting a referral to [facility] so he can schedule an appointment for new orthotics. He states that he needs a referral and that Dr Chaney places one for him every few years. I informed him that once Dr Chaney places the referral, he will be contacted by a [facility]  .

## 2021-08-30 NOTE — TELEPHONE ENCOUNTER
1st attempt: left VM to call back. If he calls back, please relay PCP's message below.    Shania Jimenez MA 4:17 PM 8/30/2021

## 2021-08-31 NOTE — TELEPHONE ENCOUNTER
Patient called back and I gave him the referral phone number to call to schedule. He had no further questions.

## 2021-09-08 ENCOUNTER — NURSE TRIAGE (OUTPATIENT)
Dept: PEDIATRICS | Facility: CLINIC | Age: 71
End: 2021-09-08

## 2021-09-08 NOTE — TELEPHONE ENCOUNTER
Symptoms    Describe your symptoms: Not munch energy    Any pain: No    How long have you been having symptoms: 6  months    Have you been seen for this:  No    Appointment offered?: No    Triage offered?: Yes: encounter    Home remedies tried: n/a    Requested [facility]acy: [facility], requesting a possible Iron Supplement Rx    Okay to leave a detailed message? Yes at Cell number on file:    Telephone Information:   Mobile [phone]

## 2021-09-09 NOTE — TELEPHONE ENCOUNTER
"  Reason for Disposition    Fatigue is a chronic symptom (recurrent or ongoing AND present > 4 weeks)    Answer Assessment - Initial Assessment Questions  1. DESCRIPTION: \"Describe how you are feeling.\"      Increasing daytime tiredness, taking 1 nap a day. Sleeping 8hrs at night and 30min-1hr.  2. SEVERITY: \"How bad is it?\"  \"Can you stand and walk?\"    - MILD - Feels weak or tired, but does not interfere with work, school or normal activities    - MODERATE - Able to stand and walk; weakness interferes with work, school, or normal activities    - SEVERE - Unable to stand or walk      Mild  3. ONSET:  \"When did the weakness begin?\"      Started roughly 6 months ago.   4. CAUSE: \"What do you think is causing the weakness?\"      Possible anemia  5. MEDICINES: \"Have you recently started a new medicine or had a change in the amount of a medicine?\"     on  6. OTHER SYMPTOMS: \"Do you have any other symptoms?\" (e.g., chest pain, fever, cough, SOB, vomiting, diarrhea, bleeding, other areas of pain)      None   7. PREGNANCY: \"Is there any chance you are pregnant?\" \"When was your last menstrual period?\"      N/A    Protocols used: WEAKNESS (GENERALIZED) AND FATIGUE-A-OH    Gema Barbosa RN on 9/9/2021 at 8:39 AM    "

## 2021-09-22 ENCOUNTER — OFFICE VISIT (OUTPATIENT)
Dept: PEDIATRICS | Facility: CLINIC | Age: 71
End: 2021-09-22
Payer: COMMERCIAL

## 2021-09-22 VITALS
HEIGHT: 69 IN | HEART RATE: 53 BPM | OXYGEN SATURATION: 95 % | BODY MASS INDEX: 28.2 KG/M2 | SYSTOLIC BLOOD PRESSURE: 128 MMHG | WEIGHT: 190.4 LBS | TEMPERATURE: 98.3 F | DIASTOLIC BLOOD PRESSURE: 74 MMHG

## 2021-09-22 DIAGNOSIS — R53.83 FATIGUE, UNSPECIFIED TYPE: Primary | ICD-10-CM

## 2021-09-22 LAB
ERYTHROCYTE [DISTWIDTH] IN BLOOD BY AUTOMATED COUNT: 12.7 % (ref 10–15)
HCT VFR BLD AUTO: 40.7 % (ref 40–53)
HGB BLD-MCNC: 13.8 G/DL (ref 13.3–17.7)
MCH RBC QN AUTO: 32.9 PG (ref 26.5–33)
MCHC RBC AUTO-ENTMCNC: 33.9 G/DL (ref 31.5–36.5)
MCV RBC AUTO: 97 FL (ref 78–100)
PLATELET # BLD AUTO: 206 10E3/UL (ref 150–450)
RBC # BLD AUTO: 4.19 10E6/UL (ref 4.4–5.9)
WBC # BLD AUTO: 5.7 10E3/UL (ref 4–11)

## 2021-09-22 PROCEDURE — 84443 ASSAY THYROID STIM HORMONE: CPT | Performed by: INTERNAL MEDICINE

## 2021-09-22 PROCEDURE — 36415 COLL VENOUS BLD VENIPUNCTURE: CPT | Performed by: INTERNAL MEDICINE

## 2021-09-22 PROCEDURE — 82728 ASSAY OF FERRITIN: CPT | Performed by: INTERNAL MEDICINE

## 2021-09-22 PROCEDURE — 82607 VITAMIN B-12: CPT | Performed by: INTERNAL MEDICINE

## 2021-09-22 PROCEDURE — 85027 COMPLETE CBC AUTOMATED: CPT | Performed by: INTERNAL MEDICINE

## 2021-09-22 PROCEDURE — 99214 OFFICE O/P EST MOD 30 MIN: CPT | Performed by: INTERNAL MEDICINE

## 2021-09-22 ASSESSMENT — MIFFLIN-ST. JEOR: SCORE: 1609.03

## 2021-09-22 NOTE — PROGRESS NOTES
"    Assessment & Plan       ICD-10-CM    1. Fatigue, unspecified type  R53.83 CBC with platelets     Ferritin     Vitamin B12     TSH with free T4 reflex     Unclear cause for fatigue sx.  Will check labwork to look for organic causes.     After OV, labwork does not indicate a cause for sx.    Encourage increasing exercise as tolerated. Monitor sleep. If has sleep disturbance noted, consider sleep referral.     Return in about 34 weeks (around 5/18/2022) for Routine Visit.    James Chaney MD  [facility] DANGELO Vences is a 70 year old who presents for the following health issues     HPI     Concern - Fatigue  Onset: 6 months  Description: tiredness, sluggishness  Intensity: mild  Progression of Symptoms:  waxing and waning  Accompanying Signs & Symptoms: none  Previous history of similar problem: none  Precipitating factors:        Worsened by: none  Alleviating factors:        Improved by: none  Therapies tried and outcome:  none     Unclear cause for sx.    Reviewed medications, taking propranolol, started in early 2019 w/o dose change.            Objective    /74 (BP Location: Right arm, Patient Position: Sitting, Cuff Size: Adult Regular)   Pulse 53   Temp 98.3  F (36.8  C)   Ht 1.753 m (5' 9\")   Wt 86.4 kg (190 lb 6.4 oz)   SpO2 95%   BMI 28.12 kg/m    Body mass index is 28.12 kg/m .  Physical Exam   GEN: no distress  SKIN: no rashes  NECK: Supple. No LAD or TM.  LUNGS: Clear to auscultation bilaterally. No rhonchi, rales, wheezes or retractions.  CV: Regular rate and rhythm.  No murmurs, rubs or gallops. Pulses 2+ radial.  EXTR: No edema  PSYCH: Normal affect. Well groomed. Good eye contact.           "

## 2021-09-22 NOTE — LETTER
September 27, 2021    Ru Coombs  [address] [place] [address]      Dear ,    Your tests are all complete. The results show:     1. Your blood counts are essentially normal. Your RBC count is slightly low, but the other measures of red blood cells are normal (hemoglobin and hematocrit).     2. Your TSH (thyroid function), vitamin B12 and ferritin (iron stores) are normal.     Resulted Orders   CBC with platelets   Result Value Ref Range    WBC Count 5.7 4.0 - 11.0 10e3/uL    RBC Count 4.19 (L) 4.40 - 5.90 10e6/uL    Hemoglobin 13.8 13.3 - 17.7 g/dL    Hematocrit 40.7 40.0 - 53.0 %    MCV 97 78 - 100 fL    MCH 32.9 26.5 - 33.0 pg    MCHC 33.9 31.5 - 36.5 g/dL    RDW 12.7 10.0 - 15.0 %    Platelet Count 206 150 - 450 10e3/uL   Ferritin   Result Value Ref Range    Ferritin 88 26 - 388 ng/mL   Vitamin B12   Result Value Ref Range    Vitamin B12 360 193 - 986 pg/mL   TSH with free T4 reflex   Result Value Ref Range    TSH 0.66 0.40 - 4.00 mU/L       If you have any questions or concerns, please call the clinic at the number listed above.       Sincerely,    James Chaney MD

## 2021-09-23 LAB
FERRITIN SERPL-MCNC: 88 NG/ML (ref 26–388)
TSH SERPL DL<=0.005 MIU/L-ACNC: 0.66 MU/L (ref 0.4–4)
VIT B12 SERPL-MCNC: 360 PG/ML (ref 193–986)

## 2021-09-30 DIAGNOSIS — N52.9 ERECTILE DYSFUNCTION, UNSPECIFIED ERECTILE DYSFUNCTION TYPE: ICD-10-CM

## 2021-10-01 RX ORDER — SILDENAFIL CITRATE 20 MG/1
TABLET ORAL
Qty: 30 TABLET | Refills: 11 | Status: SHIPPED | OUTPATIENT
Start: 2021-10-01 | End: 2022-10-12

## 2021-10-01 NOTE — TELEPHONE ENCOUNTER
Routing refill request to provider for review/approval because:  A break in medication    José Miguel MOSHER RN

## 2021-10-25 ENCOUNTER — ANCILLARY PROCEDURE (OUTPATIENT)
Dept: GENERAL RADIOLOGY | Facility: CLINIC | Age: 71
End: 2021-10-25
Attending: ORTHOPAEDIC SURGERY
Payer: COMMERCIAL

## 2021-10-25 ENCOUNTER — OFFICE VISIT (OUTPATIENT)
Dept: ORTHOPEDICS | Facility: CLINIC | Age: 71
End: 2021-10-25
Payer: COMMERCIAL

## 2021-10-25 VITALS
HEIGHT: 69 IN | WEIGHT: 190 LBS | BODY MASS INDEX: 28.14 KG/M2 | DIASTOLIC BLOOD PRESSURE: 74 MMHG | SYSTOLIC BLOOD PRESSURE: 126 MMHG

## 2021-10-25 DIAGNOSIS — M17.12 PRIMARY OSTEOARTHRITIS OF LEFT KNEE: ICD-10-CM

## 2021-10-25 DIAGNOSIS — M17.12 PRIMARY OSTEOARTHRITIS OF LEFT KNEE: Primary | ICD-10-CM

## 2021-10-25 PROCEDURE — 99214 OFFICE O/P EST MOD 30 MIN: CPT | Performed by: ORTHOPAEDIC SURGERY

## 2021-10-25 PROCEDURE — 73562 X-RAY EXAM OF KNEE 3: CPT | Mod: LT | Performed by: ORTHOPAEDIC SURGERY

## 2021-10-25 ASSESSMENT — KOOS JR
TWISING OR PIVOTING ON KNEE: MODERATE
KOOS JR SCORING: 50.01
GOING UP OR DOWN STAIRS: SEVERE
RISING FROM SITTING: MODERATE
STRAIGHTENING KNEE FULLY: MODERATE
HOW SEVERE IS YOUR KNEE STIFFNESS AFTER FIRST WAKING IN MORNING: MILD
STANDING UPRIGHT: SEVERE
BENDING TO THE FLOOR TO PICK UP OBJECT: MODERATE

## 2021-10-25 ASSESSMENT — MIFFLIN-ST. JEOR: SCORE: 1602.21

## 2021-10-25 NOTE — LETTER
10/25/2021         RE: Ru Coombs  [address] [place] [address]        Dear Colleague,    Thank you for referring your patient, Ru Coombs, to the [facility] [place]. Please see a copy of my visit note below.    HISTORY OF PRESENT ILLNESS:    Ru Coombs is a 71 year old male who is seen in follow up for left knee pain. Patient was previously evaluated on 7/8/19, since this visit he reports worsening of symptoms.  Present symptoms: left knee pain, medial knee pain, anterior inferior knee pain. Pain progresively worsens throughout the day. He reports when waking in the morning the knee feels ok, he is able to tolerate walking 3 blocks with his dogs, but reports increasing knee pain throughout the walk. At midday he states his knee is quite bothersome, but works through the pain to complete chores around the home. Occasional buckling of the knee, no acute falls.     Current pain level: 7/10  Treatments tried to this point: Ibuprofen, Orthotics, Rest/ Activity avoidance, Melatonin and Ibuprofen at nighttime.   Past Medical History: Unchanged from the visit of 7/8/19. Please refer to that note.    REVIEW OF SYSTEMS:  CONSTITUTIONAL:  NEGATIVE for fever, chills, change in weight  INTEGUMENTARY/SKIN:  NEGATIVE for worrisome rashes, moles or lesions  EYES:  NEGATIVE for vision changes or irritation  ENT/MOUTH:  NEGATIVE for ear, mouth and throat problems  RESP:  NEGATIVE for significant cough or SOB  BREAST:  NEGATIVE for masses, tenderness or discharge  CV:  NEGATIVE for chest pain, palpitations or peripheral edema  GI:  NEGATIVE for nausea, abdominal pain, heartburn, or change in bowel habits  :  Negative   MUSCULOSKELETAL:  See HPI above  NEURO:  NEGATIVE for weakness, dizziness or paresthesias  ENDOCRINE:  NEGATIVE for temperature intolerance, skin/hair changes  HEME/ALLERGY/IMMUNE:  NEGATIVE for bleeding problems  PSYCHIATRIC:  NEGATIVE for changes in mood or  "affect    PHYSICAL EXAM:  /74 (BP Location: Right arm, Patient Position: Chair)   Ht 1.753 m (5' 9\")   Wt 86.2 kg (190 lb)   BMI 28.06 kg/m    Body mass index is 28.06 kg/m .   GENERAL APPEARANCE: healthy, alert and no distress   SKIN: no suspicious lesions or rashes  NEURO: Normal strength and tone, mentation intact and speech normal  VASCULAR:  good pulses, and cappillary refill   LYMPH: no lymphadenopathy   PSYCH:  mentation appears normal and affect normal/bright    MSK:    Not in acute distress  Mild difficulty getting up from sitting  He stands with a varus deformity, left flexion contracture of about 3 degrees, left    Flexion is symmetrical and full  Extensor mechanism is intact  Patellofemoral crepitus and pain with a palpation  Medial joint line tenderness  Nontender lateral joint line  No significant effusion or erythema  Calf is not tender  Distal neurovascular status is intact      IMAGING INTERPRETATION: Severe DJD of the medial compartment and moderate DJD of the anterior compartment.  As a result of severe medial compartmental DJD, significant varus alignment is noted.  No acute fractures or other osseous pathology noted.     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  We will obtain new x-rays in preparation for total knee replacement he wants to consider.  Previous x-rays were done with the knee in flexion.    We had a long discussion about the the nature of arthritis as well as the details of the surgery for total knee replacement.    Technical specifics, overnight hospitalization and potential risks and general recovery time were discussed.  Information materials provided today.    He wants to proceed with surgery sometime in March.  I asked him to contact us about 6 weeks ahead of time to make an arrangement for total knee replacement.  All the questions were " answered.            Michael Bacon MD  Dept. Orthopedic Surgery  [facility]       Disclaimer: This note consists of symbols derived from keyboarding, dictation and/or voice recognition software. As a result, there may be errors in the script that have gone undetected. Please consider this when interpreting information found in this chart.        Again, thank you for allowing me to participate in the care of your patient.        Sincerely,        Michael Bacon MD

## 2021-10-25 NOTE — PATIENT INSTRUCTIONS
We discussed the option of  total knee replacement. Here is the summary of our discussion.  This operation takes about 60-80 minutes of time. The hospitalization  will be overnight stay in most cases.  We strongly encourage you to go home with your family members or friends to minimize exposure to other people in an environment such as rehab centers. You will be encouraged to progress with activities as soon as possible. Staying in bed is not good for circulation, breathing and digestive function. You will begin physical therapy on the day of surgery or next day.    As a review, 90-95% of patients with knee replacement are happy with the operation.The  patients who are not happy with the operation will include those patients with infection, mal-positioning of the components, mal-sizing of the components, continuing post-surgical leg weakness,  the excessive postoperative scarring limiting the range of motion, as well as rare situations of unexplained pain despite absence of any specific identifiable causes. Only about 75-80 % of the patients with total knee replacement will have no pain. For that reason, we feel it is good to have realistic  Expectations.    After the surgery, to prevent the potential problem of blood clot after the surgery, you'll be asked to take aspirin or to undergo a period of blood thinner injections. If you were taking Coumadin preoperatively, you'll be going back to Coumadin.    Compared to a hip replacement, in general, a knee replacement Is more painful. Pain is significant for the first 2-5  days.  In addition,  The amount of  work you have to put in after the surgery is greater. Unless you are very comfortable with the exercise routines, you'll be going to an out patient  physical therapy facility 2-3 times per week for 2-4 weeks after the surgery. The level of pain following the surgery varies from one person to another, but it would not be unreasonable to expect the pain will continue  "for 6 to 8 weeks before you stop \"thinking\" about the pain. During that time it is expected that you will have \"good\" days and \"not so good\" days. If pain increases, it probably means you are pushing yourself too much. Take it a little easier and focus on stretching until you are back on track.    The adage of \"No pain, no gain\" should not be adopted in this situation. \"Over doing\" certainly can cause more irritation/discomfort and can cause slow recovery rather that quick recovery . The general goal in terms of range of motion  is approximately  115 degrees, however you may not get that amount of flexion if you have a significant pre-existing problems of tightness before the surgery. It is very important to remember, maintaining full extension(straightening) is far more important than flexion(bending) first 2 to 3 weeks. You will need to use a knee immobilizer until you can do 10 straight leg raises. Crutches/walker can be discontinued according to your progress.      One of the major long-term problem with the knee replacement is loosening which limits the lifespan of the knee replacement. This problem can happen at any time but typically happens after 10-15 years from the surgery. For this reason, we do not recommend any type of impact activities such as jogging or running. Walking, biking, swimming, golfing and double tennis are O.K. To do.      For some patients with the risks factors such as diabetes, immunocompromise, chronic prednisone usage and chronic illness, you are recommended to take an antibiotic for procedures such as dental cleaning, oral surgery, colonoscopy as well as any type of surgery that involves a significant incision. Usually amoxicillin or clindamycin is used for this purpose. you'll be taking the medication one hour before the procedure.Please let it dentist know that you have artificial implants in your body.    "

## 2021-10-25 NOTE — PROGRESS NOTES
"HISTORY OF PRESENT ILLNESS:    Ru Coombs is a 71 year old male who is seen in follow up for left knee pain. Patient was previously evaluated on 7/8/19, since this visit he reports worsening of symptoms.  Present symptoms: left knee pain, medial knee pain, anterior inferior knee pain. Pain progresively worsens throughout the day. He reports when waking in the morning the knee feels ok, he is able to tolerate walking 3 blocks with his dogs, but reports increasing knee pain throughout the walk. At midday he states his knee is quite bothersome, but works through the pain to complete chores around the home. Occasional buckling of the knee, no acute falls.     Current pain level: 7/10  Treatments tried to this point: Ibuprofen, Orthotics, Rest/ Activity avoidance, Melatonin and Ibuprofen at nighttime.   Past Medical History: Unchanged from the visit of 7/8/19. Please refer to that note.    REVIEW OF SYSTEMS:  CONSTITUTIONAL:  NEGATIVE for fever, chills, change in weight  INTEGUMENTARY/SKIN:  NEGATIVE for worrisome rashes, moles or lesions  EYES:  NEGATIVE for vision changes or irritation  ENT/MOUTH:  NEGATIVE for ear, mouth and throat problems  RESP:  NEGATIVE for significant cough or SOB  BREAST:  NEGATIVE for masses, tenderness or discharge  CV:  NEGATIVE for chest pain, palpitations or peripheral edema  GI:  NEGATIVE for nausea, abdominal pain, heartburn, or change in bowel habits  :  Negative   MUSCULOSKELETAL:  See HPI above  NEURO:  NEGATIVE for weakness, dizziness or paresthesias  ENDOCRINE:  NEGATIVE for temperature intolerance, skin/hair changes  HEME/ALLERGY/IMMUNE:  NEGATIVE for bleeding problems  PSYCHIATRIC:  NEGATIVE for changes in mood or affect    PHYSICAL EXAM:  /74 (BP Location: Right arm, Patient Position: Chair)   Ht 1.753 m (5' 9\")   Wt 86.2 kg (190 lb)   BMI 28.06 kg/m    Body mass index is 28.06 kg/m .   GENERAL APPEARANCE: healthy, alert and no distress   SKIN: no suspicious lesions " or rashes  NEURO: Normal strength and tone, mentation intact and speech normal  VASCULAR:  good pulses, and cappillary refill   LYMPH: no lymphadenopathy   PSYCH:  mentation appears normal and affect normal/bright    MSK:    Not in acute distress  Mild difficulty getting up from sitting  He stands with a varus deformity, left flexion contracture of about 3 degrees, left    Flexion is symmetrical and full  Extensor mechanism is intact  Patellofemoral crepitus and pain with a palpation  Medial joint line tenderness  Nontender lateral joint line  No significant effusion or erythema  Calf is not tender  Distal neurovascular status is intact      IMAGING INTERPRETATION: Severe DJD of the medial compartment and moderate DJD of the anterior compartment.  As a result of severe medial compartmental DJD, significant varus alignment is noted.  No acute fractures or other osseous pathology noted.     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  We will obtain new x-rays in preparation for total knee replacement he wants to consider.  Previous x-rays were done with the knee in flexion.    We had a long discussion about the the nature of arthritis as well as the details of the surgery for total knee replacement.    Technical specifics, overnight hospitalization and potential risks and general recovery time were discussed.  Information materials provided today.    He wants to proceed with surgery sometime in March.  I asked him to contact us about 6 weeks ahead of time to make an arrangement for total knee replacement.  All the questions were answered.            Michael Bacon MD  Dept. Orthopedic Surgery  [facility]       Disclaimer: This note consists of symbols derived from keyboarding, dictation and/or voice recognition software. As a result, there may be errors in the script that have gone undetected.  Please consider this when interpreting information found in this chart.

## 2022-01-04 ENCOUNTER — TELEPHONE (OUTPATIENT)
Dept: ORTHOPEDICS | Facility: CLINIC | Age: 72
End: 2022-01-04
Payer: COMMERCIAL

## 2022-01-04 DIAGNOSIS — M17.12 PRIMARY OSTEOARTHRITIS OF LEFT KNEE: Primary | ICD-10-CM

## 2022-01-04 NOTE — TELEPHONE ENCOUNTER
Patient wants to schedule left TKA surgery.     Tentative date 3/15/22.     Please place surgery orders.       Alicia Holcomb, Surgery Scheduler

## 2022-01-06 ENCOUNTER — PREP FOR PROCEDURE (OUTPATIENT)
Dept: ORTHOPEDICS | Facility: CLINIC | Age: 72
End: 2022-01-06
Payer: COMMERCIAL

## 2022-01-06 DIAGNOSIS — M17.12 PRIMARY OSTEOARTHRITIS OF LEFT KNEE: Primary | ICD-10-CM

## 2022-01-19 ENCOUNTER — TELEPHONE (OUTPATIENT)
Dept: ORTHOPEDICS | Facility: CLINIC | Age: 72
End: 2022-01-19
Payer: COMMERCIAL

## 2022-01-19 DIAGNOSIS — M17.12 PRIMARY OSTEOARTHRITIS OF LEFT KNEE: Primary | ICD-10-CM

## 2022-01-19 NOTE — TELEPHONE ENCOUNTER
Scheduled surgery    ATC: please place PT order.  Patient wants to wait until closer to surgery date to schedule PT appointments.     Type of surgery: left TKA  Location of surgery: [facility]  Date and time of surgery: 5/3/22  Surgeon: Arleen  Pre-Op Appt Date: 4/15/22  Post-Op Appt Date: 5/13/22   Packet sent out: Yes  Pre-cert/Authorization completed:  Not Applicable  Date: 1.19.22      Alicia Holcmob Surgery Scheduler

## 2022-03-08 ENCOUNTER — TELEPHONE (OUTPATIENT)
Dept: ORTHOPEDICS | Facility: CLINIC | Age: 72
End: 2022-03-08
Payer: COMMERCIAL

## 2022-03-08 NOTE — TELEPHONE ENCOUNTER
Changes made to Layo HEBERT's schedule for Fridays, called and left a VM for patient to reschedule POST OP appt with him.     Original appt is 5/13 but Layo has openings for 5/12.    Please cabrera when patient returns call back.

## 2022-03-18 DIAGNOSIS — Z11.59 ENCOUNTER FOR SCREENING FOR OTHER VIRAL DISEASES: Primary | ICD-10-CM

## 2022-04-15 ENCOUNTER — OFFICE VISIT (OUTPATIENT)
Dept: PEDIATRICS | Facility: CLINIC | Age: 72
End: 2022-04-15
Payer: COMMERCIAL

## 2022-04-15 VITALS
DIASTOLIC BLOOD PRESSURE: 80 MMHG | SYSTOLIC BLOOD PRESSURE: 116 MMHG | OXYGEN SATURATION: 97 % | BODY MASS INDEX: 27.83 KG/M2 | TEMPERATURE: 98.1 F | RESPIRATION RATE: 16 BRPM | HEART RATE: 57 BPM | HEIGHT: 69 IN | WEIGHT: 187.9 LBS

## 2022-04-15 DIAGNOSIS — Z01.818 PREOP GENERAL PHYSICAL EXAM: Primary | ICD-10-CM

## 2022-04-15 DIAGNOSIS — M17.12 ARTHRITIS OF LEFT KNEE: ICD-10-CM

## 2022-04-15 LAB
ERYTHROCYTE [DISTWIDTH] IN BLOOD BY AUTOMATED COUNT: 13 % (ref 10–15)
HCT VFR BLD AUTO: 42.8 % (ref 40–53)
HGB BLD-MCNC: 14.6 G/DL (ref 13.3–17.7)
MCH RBC QN AUTO: 33 PG (ref 26.5–33)
MCHC RBC AUTO-ENTMCNC: 34.1 G/DL (ref 31.5–36.5)
MCV RBC AUTO: 97 FL (ref 78–100)
PLATELET # BLD AUTO: 236 10E3/UL (ref 150–450)
RBC # BLD AUTO: 4.43 10E6/UL (ref 4.4–5.9)
WBC # BLD AUTO: 5.5 10E3/UL (ref 4–11)

## 2022-04-15 PROCEDURE — 99214 OFFICE O/P EST MOD 30 MIN: CPT | Performed by: INTERNAL MEDICINE

## 2022-04-15 PROCEDURE — 36415 COLL VENOUS BLD VENIPUNCTURE: CPT | Performed by: INTERNAL MEDICINE

## 2022-04-15 PROCEDURE — 80048 BASIC METABOLIC PNL TOTAL CA: CPT | Performed by: INTERNAL MEDICINE

## 2022-04-15 PROCEDURE — 93000 ELECTROCARDIOGRAM COMPLETE: CPT | Performed by: INTERNAL MEDICINE

## 2022-04-15 PROCEDURE — 85027 COMPLETE CBC AUTOMATED: CPT | Performed by: INTERNAL MEDICINE

## 2022-04-15 SDOH — ECONOMIC STABILITY: FOOD INSECURITY: WITHIN THE PAST 12 MONTHS, THE FOOD YOU BOUGHT JUST DIDN'T LAST AND YOU DIDN'T HAVE MONEY TO GET MORE.: NEVER TRUE

## 2022-04-15 SDOH — HEALTH STABILITY: PHYSICAL HEALTH: ON AVERAGE, HOW MANY MINUTES DO YOU ENGAGE IN EXERCISE AT THIS LEVEL?: 50 MIN

## 2022-04-15 SDOH — HEALTH STABILITY: PHYSICAL HEALTH: ON AVERAGE, HOW MANY DAYS PER WEEK DO YOU ENGAGE IN MODERATE TO STRENUOUS EXERCISE (LIKE A BRISK WALK)?: 7 DAYS

## 2022-04-15 SDOH — ECONOMIC STABILITY: FOOD INSECURITY: WITHIN THE PAST 12 MONTHS, YOU WORRIED THAT YOUR FOOD WOULD RUN OUT BEFORE YOU GOT MONEY TO BUY MORE.: NEVER TRUE

## 2022-04-15 SDOH — ECONOMIC STABILITY: INCOME INSECURITY: IN THE LAST 12 MONTHS, WAS THERE A TIME WHEN YOU WERE NOT ABLE TO PAY THE MORTGAGE OR RENT ON TIME?: NO

## 2022-04-15 SDOH — ECONOMIC STABILITY: INCOME INSECURITY: HOW HARD IS IT FOR YOU TO PAY FOR THE VERY BASICS LIKE FOOD, HOUSING, MEDICAL CARE, AND HEATING?: NOT HARD AT ALL

## 2022-04-15 ASSESSMENT — LIFESTYLE VARIABLES
SKIP TO QUESTIONS 9-10: 1
AUDIT-C TOTAL SCORE: 4
HOW OFTEN DO YOU HAVE SIX OR MORE DRINKS ON ONE OCCASION: NEVER
HOW MANY STANDARD DRINKS CONTAINING ALCOHOL DO YOU HAVE ON A TYPICAL DAY: 1 OR 2
HOW OFTEN DO YOU HAVE A DRINK CONTAINING ALCOHOL: 4 OR MORE TIMES A WEEK

## 2022-04-15 ASSESSMENT — SOCIAL DETERMINANTS OF HEALTH (SDOH)
IN A TYPICAL WEEK, HOW MANY TIMES DO YOU TALK ON THE PHONE WITH FAMILY, FRIENDS, OR NEIGHBORS?: TWICE A WEEK
DO YOU BELONG TO ANY CLUBS OR ORGANIZATIONS SUCH AS CHURCH GROUPS UNIONS, FRATERNAL OR ATHLETIC GROUPS, OR SCHOOL GROUPS?: YES
HOW OFTEN DO YOU ATTEND CHURCH OR RELIGIOUS SERVICES?: NEVER
HOW OFTEN DO YOU GET TOGETHER WITH FRIENDS OR RELATIVES?: ONCE A WEEK
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
## 2022-04-15 NOTE — PATIENT INSTRUCTIONS
Stop aspirin at least 7 days prior to surgery    Stop all supplements as well as ibuprofen and naproxen 3 days prior to surgery    Take propranolol through the time of your surgery    Acetaminophen is fine to take for pain
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
## 2022-04-15 NOTE — LETTER
April 18, 2022      Ru Coombs  [address] [place] [address]        Ru:     Your tests are all complete. The results show:     1. Your metabolic panel, including kidney function, glucose (blood sugar) and electrolytes, is normal.     2. Your blood counts are also normal.     Resulted Orders   CBC with platelets   Result Value Ref Range    WBC Count 5.5 4.0 - 11.0 10e3/uL    RBC Count 4.43 4.40 - 5.90 10e6/uL    Hemoglobin 14.6 13.3 - 17.7 g/dL    Hematocrit 42.8 40.0 - 53.0 %    MCV 97 78 - 100 fL    MCH 33.0 26.5 - 33.0 pg    MCHC 34.1 31.5 - 36.5 g/dL    RDW 13.0 10.0 - 15.0 %    Platelet Count 236 150 - 450 10e3/uL   Basic metabolic panel   Result Value Ref Range    Sodium 140 133 - 144 mmol/L    Potassium 4.3 3.4 - 5.3 mmol/L    Chloride 109 94 - 109 mmol/L    Carbon Dioxide (CO2) 29 20 - 32 mmol/L    Anion Gap 2 (L) 3 - 14 mmol/L    Urea Nitrogen 16 7 - 30 mg/dL    Creatinine 0.75 0.66 - 1.25 mg/dL    Calcium 9.6 8.5 - 10.1 mg/dL    Glucose 88 70 - 99 mg/dL    GFR Estimate >90 >60 mL/min/1.73m2      Comment:      Effective December 21, 2021 eGFRcr in adults is calculated using the 2021 CKD-EPI creatinine equation which includes age and gender (Danna et al., NEJM, DOI: 10.1056/IWCNgp7682759)       Please feel free to contact me if you have any questions or concerns.       James Chaney

## 2022-04-16 LAB
ANION GAP SERPL CALCULATED.3IONS-SCNC: 2 MMOL/L (ref 3–14)
BUN SERPL-MCNC: 16 MG/DL (ref 7–30)
CALCIUM SERPL-MCNC: 9.6 MG/DL (ref 8.5–10.1)
CHLORIDE BLD-SCNC: 109 MMOL/L (ref 94–109)
CO2 SERPL-SCNC: 29 MMOL/L (ref 20–32)
CREAT SERPL-MCNC: 0.75 MG/DL (ref 0.66–1.25)
GFR SERPL CREATININE-BSD FRML MDRD: >90 ML/MIN/1.73M2
GLUCOSE BLD-MCNC: 88 MG/DL (ref 70–99)
POTASSIUM BLD-SCNC: 4.3 MMOL/L (ref 3.4–5.3)
SODIUM SERPL-SCNC: 140 MMOL/L (ref 133–144)

## 2022-04-20 RX ORDER — IBUPROFEN 200 MG
200-400 TABLET ORAL PRN
COMMUNITY
End: 2023-01-13

## 2022-05-02 ENCOUNTER — LAB (OUTPATIENT)
Dept: URGENT CARE | Facility: URGENT CARE | Age: 72
End: 2022-05-02
Payer: COMMERCIAL

## 2022-05-02 DIAGNOSIS — Z11.59 ENCOUNTER FOR SCREENING FOR OTHER VIRAL DISEASES: ICD-10-CM

## 2022-05-02 LAB — SARS-COV-2 RNA RESP QL NAA+PROBE: NEGATIVE

## 2022-05-02 PROCEDURE — U0005 INFEC AGEN DETEC AMPLI PROBE: HCPCS

## 2022-05-02 PROCEDURE — U0003 INFECTIOUS AGENT DETECTION BY NUCLEIC ACID (DNA OR RNA); SEVERE ACUTE RESPIRATORY SYNDROME CORONAVIRUS 2 (SARS-COV-2) (CORONAVIRUS DISEASE [COVID-19]), AMPLIFIED PROBE TECHNIQUE, MAKING USE OF HIGH THROUGHPUT TECHNOLOGIES AS DESCRIBED BY CMS-2020-01-R: HCPCS

## 2022-05-03 ENCOUNTER — ANESTHESIA (OUTPATIENT)
Dept: SURGERY | Facility: CLINIC | Age: 72
End: 2022-05-03
Payer: COMMERCIAL

## 2022-05-03 ENCOUNTER — APPOINTMENT (OUTPATIENT)
Dept: PHYSICAL THERAPY | Facility: CLINIC | Age: 72
End: 2022-05-03
Attending: ORTHOPAEDIC SURGERY
Payer: COMMERCIAL

## 2022-05-03 ENCOUNTER — ANESTHESIA EVENT (OUTPATIENT)
Dept: SURGERY | Facility: CLINIC | Age: 72
End: 2022-05-03
Payer: COMMERCIAL

## 2022-05-03 ENCOUNTER — HOSPITAL ENCOUNTER (OUTPATIENT)
Facility: CLINIC | Age: 72
Discharge: HOME | End: 2022-05-04
Attending: ORTHOPAEDIC SURGERY | Admitting: ORTHOPAEDIC SURGERY
Payer: COMMERCIAL

## 2022-05-03 DIAGNOSIS — K59.03 DRUG-INDUCED CONSTIPATION: ICD-10-CM

## 2022-05-03 DIAGNOSIS — Z96.652 STATUS POST TOTAL LEFT KNEE REPLACEMENT USING CEMENT: Primary | ICD-10-CM

## 2022-05-03 DIAGNOSIS — G89.18 ACUTE POST-OPERATIVE PAIN: ICD-10-CM

## 2022-05-03 DIAGNOSIS — Z78.9 DEEP VEIN THROMBOSIS (DVT) PROPHYLAXIS PRESCRIBED AT DISCHARGE: ICD-10-CM

## 2022-05-03 LAB
CREAT SERPL-MCNC: 0.66 MG/DL (ref 0.66–1.25)
GFR SERPL CREATININE-BSD FRML MDRD: >90 ML/MIN/1.73M2
PLATELET # BLD AUTO: 213 10E3/UL (ref 150–450)

## 2022-05-03 PROCEDURE — 278N000051 HC OR IMPLANT GENERAL: Performed by: ORTHOPAEDIC SURGERY

## 2022-05-03 PROCEDURE — 999N000141 HC STATISTIC PRE-PROCEDURE NURSING ASSESSMENT: Performed by: ORTHOPAEDIC SURGERY

## 2022-05-03 PROCEDURE — 272N000001 HC OR GENERAL SUPPLY STERILE: Performed by: ORTHOPAEDIC SURGERY

## 2022-05-03 PROCEDURE — 97161 PT EVAL LOW COMPLEX 20 MIN: CPT | Mod: GP

## 2022-05-03 PROCEDURE — 97530 THERAPEUTIC ACTIVITIES: CPT | Mod: GP

## 2022-05-03 PROCEDURE — 370N000017 HC ANESTHESIA TECHNICAL FEE, PER MIN: Performed by: ORTHOPAEDIC SURGERY

## 2022-05-03 PROCEDURE — 27447 TOTAL KNEE ARTHROPLASTY: CPT | Mod: LT | Performed by: ORTHOPAEDIC SURGERY

## 2022-05-03 PROCEDURE — 250N000011 HC RX IP 250 OP 636: Performed by: NURSE ANESTHETIST, CERTIFIED REGISTERED

## 2022-05-03 PROCEDURE — 250N000011 HC RX IP 250 OP 636: Performed by: ORTHOPAEDIC SURGERY

## 2022-05-03 PROCEDURE — 258N000003 HC RX IP 258 OP 636: Performed by: ANESTHESIOLOGY

## 2022-05-03 PROCEDURE — 271N000001 HC OR GENERAL SUPPLY NON-STERILE: Performed by: ORTHOPAEDIC SURGERY

## 2022-05-03 PROCEDURE — 27447 TOTAL KNEE ARTHROPLASTY: CPT | Mod: AS | Performed by: PHYSICIAN ASSISTANT

## 2022-05-03 PROCEDURE — 36415 COLL VENOUS BLD VENIPUNCTURE: CPT | Performed by: ORTHOPAEDIC SURGERY

## 2022-05-03 PROCEDURE — 258N000003 HC RX IP 258 OP 636: Performed by: PHYSICIAN ASSISTANT

## 2022-05-03 PROCEDURE — 82565 ASSAY OF CREATININE: CPT | Performed by: PHYSICIAN ASSISTANT

## 2022-05-03 PROCEDURE — 360N000077 HC SURGERY LEVEL 4, PER MIN: Performed by: ORTHOPAEDIC SURGERY

## 2022-05-03 PROCEDURE — 250N000009 HC RX 250: Performed by: NURSE ANESTHETIST, CERTIFIED REGISTERED

## 2022-05-03 PROCEDURE — 97116 GAIT TRAINING THERAPY: CPT | Mod: GP

## 2022-05-03 PROCEDURE — 258N000001 HC RX 258: Performed by: ORTHOPAEDIC SURGERY

## 2022-05-03 PROCEDURE — 250N000013 HC RX MED GY IP 250 OP 250 PS 637: Performed by: PHYSICIAN ASSISTANT

## 2022-05-03 PROCEDURE — C1776 JOINT DEVICE (IMPLANTABLE): HCPCS | Performed by: ORTHOPAEDIC SURGERY

## 2022-05-03 PROCEDURE — 250N000011 HC RX IP 250 OP 636: Performed by: PHYSICIAN ASSISTANT

## 2022-05-03 PROCEDURE — 710N000009 HC RECOVERY PHASE 1, LEVEL 1, PER MIN: Performed by: ORTHOPAEDIC SURGERY

## 2022-05-03 PROCEDURE — 85049 AUTOMATED PLATELET COUNT: CPT | Performed by: ORTHOPAEDIC SURGERY

## 2022-05-03 PROCEDURE — 250N000013 HC RX MED GY IP 250 OP 250 PS 637: Performed by: ORTHOPAEDIC SURGERY

## 2022-05-03 PROCEDURE — 258N000003 HC RX IP 258 OP 636: Performed by: ORTHOPAEDIC SURGERY

## 2022-05-03 DEVICE — IMP BASEPLATE TIBIAL GENESIS II SZ 7 LT TI 71420172: Type: IMPLANTABLE DEVICE | Site: KNEE | Status: FUNCTIONAL

## 2022-05-03 DEVICE — IMP COMP PATELLA SNN GEN II RESURFACING 38MM 71926225: Type: IMPLANTABLE DEVICE | Site: KNEE | Status: FUNCTIONAL

## 2022-05-03 DEVICE — IMPLANTABLE DEVICE: Type: IMPLANTABLE DEVICE | Site: KNEE | Status: FUNCTIONAL

## 2022-05-03 DEVICE — IMP INSERT ARTICULAR S&N LGN CR XLPE SZ7-8 9MM 71453131: Type: IMPLANTABLE DEVICE | Site: KNEE | Status: FUNCTIONAL

## 2022-05-03 DEVICE — BONE CEMENT SIMPLEX FULL DOSE 6191-1-001: Type: IMPLANTABLE DEVICE | Site: KNEE | Status: FUNCTIONAL

## 2022-05-03 RX ORDER — SODIUM CHLORIDE, SODIUM LACTATE, POTASSIUM CHLORIDE, CALCIUM CHLORIDE 600; 310; 30; 20 MG/100ML; MG/100ML; MG/100ML; MG/100ML
INJECTION, SOLUTION INTRAVENOUS CONTINUOUS
Status: DISCONTINUED | OUTPATIENT
Start: 2022-05-03 | End: 2022-05-03

## 2022-05-03 RX ORDER — ONDANSETRON 4 MG/1
4 TABLET, ORALLY DISINTEGRATING ORAL EVERY 6 HOURS PRN
Status: DISCONTINUED | OUTPATIENT
Start: 2022-05-03 | End: 2022-05-04 | Stop reason: HOSPADM

## 2022-05-03 RX ORDER — NALOXONE HYDROCHLORIDE 0.4 MG/ML
0.4 INJECTION, SOLUTION INTRAMUSCULAR; INTRAVENOUS; SUBCUTANEOUS
Status: DISCONTINUED | OUTPATIENT
Start: 2022-05-03 | End: 2022-05-04 | Stop reason: HOSPADM

## 2022-05-03 RX ORDER — LIDOCAINE 40 MG/G
CREAM TOPICAL
Status: DISCONTINUED | OUTPATIENT
Start: 2022-05-03 | End: 2022-05-03

## 2022-05-03 RX ORDER — NALOXONE HYDROCHLORIDE 0.4 MG/ML
0.2 INJECTION, SOLUTION INTRAMUSCULAR; INTRAVENOUS; SUBCUTANEOUS
Status: DISCONTINUED | OUTPATIENT
Start: 2022-05-03 | End: 2022-05-04 | Stop reason: HOSPADM

## 2022-05-03 RX ORDER — KETOROLAC TROMETHAMINE 15 MG/ML
15 INJECTION, SOLUTION INTRAMUSCULAR; INTRAVENOUS EVERY 6 HOURS
Status: DISCONTINUED | OUTPATIENT
Start: 2022-05-03 | End: 2022-05-04 | Stop reason: HOSPADM

## 2022-05-03 RX ORDER — ONDANSETRON 2 MG/ML
INJECTION INTRAMUSCULAR; INTRAVENOUS PRN
Status: DISCONTINUED | OUTPATIENT
Start: 2022-05-03 | End: 2022-05-03

## 2022-05-03 RX ORDER — GLYCOPYRROLATE 0.2 MG/ML
INJECTION, SOLUTION INTRAMUSCULAR; INTRAVENOUS PRN
Status: DISCONTINUED | OUTPATIENT
Start: 2022-05-03 | End: 2022-05-03

## 2022-05-03 RX ORDER — CEFAZOLIN SODIUM 2 G/100ML
2 INJECTION, SOLUTION INTRAVENOUS EVERY 8 HOURS
Status: COMPLETED | OUTPATIENT
Start: 2022-05-03 | End: 2022-05-04

## 2022-05-03 RX ORDER — ACETAMINOPHEN 325 MG/1
650 TABLET ORAL EVERY 4 HOURS PRN
Status: DISCONTINUED | OUTPATIENT
Start: 2022-05-06 | End: 2022-05-04 | Stop reason: HOSPADM

## 2022-05-03 RX ORDER — PROPOFOL 10 MG/ML
INJECTION, EMULSION INTRAVENOUS PRN
Status: DISCONTINUED | OUTPATIENT
Start: 2022-05-03 | End: 2022-05-03

## 2022-05-03 RX ORDER — KETAMINE HYDROCHLORIDE 10 MG/ML
INJECTION INTRAMUSCULAR; INTRAVENOUS PRN
Status: DISCONTINUED | OUTPATIENT
Start: 2022-05-03 | End: 2022-05-03

## 2022-05-03 RX ORDER — ONDANSETRON 2 MG/ML
4 INJECTION INTRAMUSCULAR; INTRAVENOUS EVERY 6 HOURS PRN
Status: DISCONTINUED | OUTPATIENT
Start: 2022-05-03 | End: 2022-05-04 | Stop reason: HOSPADM

## 2022-05-03 RX ORDER — PROCHLORPERAZINE MALEATE 5 MG
5 TABLET ORAL EVERY 6 HOURS PRN
Status: DISCONTINUED | OUTPATIENT
Start: 2022-05-03 | End: 2022-05-04 | Stop reason: HOSPADM

## 2022-05-03 RX ORDER — IBUPROFEN 600 MG/1
600 TABLET, FILM COATED ORAL EVERY 6 HOURS PRN
Qty: 30 TABLET | Refills: 0
Start: 2022-05-03

## 2022-05-03 RX ORDER — POLYETHYLENE GLYCOL 3350 17 G/17G
17 POWDER, FOR SOLUTION ORAL DAILY
Status: DISCONTINUED | OUTPATIENT
Start: 2022-05-04 | End: 2022-05-04 | Stop reason: HOSPADM

## 2022-05-03 RX ORDER — TRAMADOL HYDROCHLORIDE 50 MG/1
50 TABLET ORAL EVERY 6 HOURS PRN
Qty: 30 TABLET | Refills: 0 | Status: SHIPPED | OUTPATIENT
Start: 2022-05-03 | End: 2022-11-16

## 2022-05-03 RX ORDER — FENTANYL CITRATE 50 UG/ML
INJECTION, SOLUTION INTRAMUSCULAR; INTRAVENOUS PRN
Status: DISCONTINUED | OUTPATIENT
Start: 2022-05-03 | End: 2022-05-03

## 2022-05-03 RX ORDER — SODIUM CHLORIDE, SODIUM LACTATE, POTASSIUM CHLORIDE, CALCIUM CHLORIDE 600; 310; 30; 20 MG/100ML; MG/100ML; MG/100ML; MG/100ML
INJECTION, SOLUTION INTRAVENOUS CONTINUOUS
Status: DISCONTINUED | OUTPATIENT
Start: 2022-05-03 | End: 2022-05-03 | Stop reason: HOSPADM

## 2022-05-03 RX ORDER — PROPRANOLOL HYDROCHLORIDE 20 MG/1
60 TABLET ORAL DAILY
Status: DISCONTINUED | OUTPATIENT
Start: 2022-05-03 | End: 2022-05-04 | Stop reason: HOSPADM

## 2022-05-03 RX ORDER — FENTANYL CITRATE 50 UG/ML
25 INJECTION, SOLUTION INTRAMUSCULAR; INTRAVENOUS EVERY 5 MIN PRN
Status: DISCONTINUED | OUTPATIENT
Start: 2022-05-03 | End: 2022-05-03 | Stop reason: HOSPADM

## 2022-05-03 RX ORDER — OXYCODONE HYDROCHLORIDE 5 MG/1
5 TABLET ORAL EVERY 4 HOURS PRN
Status: DISCONTINUED | OUTPATIENT
Start: 2022-05-03 | End: 2022-05-03 | Stop reason: HOSPADM

## 2022-05-03 RX ORDER — ACETAMINOPHEN 325 MG/1
650 TABLET ORAL EVERY 4 HOURS PRN
Qty: 100 TABLET | Refills: 0 | Status: SHIPPED | OUTPATIENT
Start: 2022-05-03 | End: 2022-11-16

## 2022-05-03 RX ORDER — BISACODYL 10 MG
10 SUPPOSITORY, RECTAL RECTAL DAILY PRN
Status: DISCONTINUED | OUTPATIENT
Start: 2022-05-03 | End: 2022-05-04 | Stop reason: HOSPADM

## 2022-05-03 RX ORDER — LIDOCAINE HYDROCHLORIDE 10 MG/ML
INJECTION, SOLUTION INFILTRATION; PERINEURAL PRN
Status: DISCONTINUED | OUTPATIENT
Start: 2022-05-03 | End: 2022-05-03

## 2022-05-03 RX ORDER — POLYETHYLENE GLYCOL 3350 17 G/17G
1 POWDER, FOR SOLUTION ORAL DAILY
Qty: 7 PACKET | Refills: 0 | Status: SHIPPED | OUTPATIENT
Start: 2022-05-03

## 2022-05-03 RX ORDER — ATROPINE SULFATE 0.4 MG/ML
AMPUL (ML) INJECTION PRN
Status: DISCONTINUED | OUTPATIENT
Start: 2022-05-03 | End: 2022-05-03

## 2022-05-03 RX ORDER — ONDANSETRON 4 MG/1
4 TABLET, ORALLY DISINTEGRATING ORAL EVERY 30 MIN PRN
Status: DISCONTINUED | OUTPATIENT
Start: 2022-05-03 | End: 2022-05-03 | Stop reason: HOSPADM

## 2022-05-03 RX ORDER — AMOXICILLIN 250 MG
1-2 CAPSULE ORAL 2 TIMES DAILY
Qty: 30 TABLET | Refills: 0 | Status: SHIPPED | OUTPATIENT
Start: 2022-05-03

## 2022-05-03 RX ORDER — KETOROLAC TROMETHAMINE 30 MG/ML
15 INJECTION, SOLUTION INTRAMUSCULAR; INTRAVENOUS EVERY 6 HOURS
Status: DISCONTINUED | OUTPATIENT
Start: 2022-05-03 | End: 2022-05-03

## 2022-05-03 RX ORDER — CEFAZOLIN SODIUM/WATER 2 G/20 ML
2 SYRINGE (ML) INTRAVENOUS
Status: COMPLETED | OUTPATIENT
Start: 2022-05-03 | End: 2022-05-03

## 2022-05-03 RX ORDER — CEFAZOLIN SODIUM/WATER 2 G/20 ML
2 SYRINGE (ML) INTRAVENOUS SEE ADMIN INSTRUCTIONS
Status: DISCONTINUED | OUTPATIENT
Start: 2022-05-03 | End: 2022-05-03

## 2022-05-03 RX ORDER — TRAMADOL HYDROCHLORIDE 50 MG/1
50 TABLET ORAL EVERY 6 HOURS PRN
Status: DISCONTINUED | OUTPATIENT
Start: 2022-05-03 | End: 2022-05-04 | Stop reason: HOSPADM

## 2022-05-03 RX ORDER — ALBUTEROL SULFATE 90 UG/1
2 AEROSOL, METERED RESPIRATORY (INHALATION) EVERY 4 HOURS PRN
Status: DISCONTINUED | OUTPATIENT
Start: 2022-05-03 | End: 2022-05-04 | Stop reason: HOSPADM

## 2022-05-03 RX ORDER — ONDANSETRON 2 MG/ML
4 INJECTION INTRAMUSCULAR; INTRAVENOUS EVERY 30 MIN PRN
Status: DISCONTINUED | OUTPATIENT
Start: 2022-05-03 | End: 2022-05-03 | Stop reason: HOSPADM

## 2022-05-03 RX ORDER — TRANEXAMIC ACID 650 MG/1
1950 TABLET ORAL ONCE
Status: COMPLETED | OUTPATIENT
Start: 2022-05-03 | End: 2022-05-03

## 2022-05-03 RX ORDER — DEXAMETHASONE SODIUM PHOSPHATE 4 MG/ML
INJECTION, SOLUTION INTRA-ARTICULAR; INTRALESIONAL; INTRAMUSCULAR; INTRAVENOUS; SOFT TISSUE PRN
Status: DISCONTINUED | OUTPATIENT
Start: 2022-05-03 | End: 2022-05-03

## 2022-05-03 RX ORDER — HYDROXYZINE HYDROCHLORIDE 10 MG/1
10 TABLET, FILM COATED ORAL EVERY 6 HOURS PRN
Status: DISCONTINUED | OUTPATIENT
Start: 2022-05-03 | End: 2022-05-04 | Stop reason: HOSPADM

## 2022-05-03 RX ORDER — LIDOCAINE 40 MG/G
CREAM TOPICAL
Status: DISCONTINUED | OUTPATIENT
Start: 2022-05-03 | End: 2022-05-04 | Stop reason: HOSPADM

## 2022-05-03 RX ORDER — SODIUM CHLORIDE, SODIUM LACTATE, POTASSIUM CHLORIDE, CALCIUM CHLORIDE 600; 310; 30; 20 MG/100ML; MG/100ML; MG/100ML; MG/100ML
INJECTION, SOLUTION INTRAVENOUS CONTINUOUS
Status: DISCONTINUED | OUTPATIENT
Start: 2022-05-03 | End: 2022-05-04 | Stop reason: HOSPADM

## 2022-05-03 RX ORDER — AMOXICILLIN 250 MG
1 CAPSULE ORAL 2 TIMES DAILY
Status: DISCONTINUED | OUTPATIENT
Start: 2022-05-03 | End: 2022-05-04 | Stop reason: HOSPADM

## 2022-05-03 RX ORDER — CELECOXIB 200 MG/1
400 CAPSULE ORAL ONCE
Status: COMPLETED | OUTPATIENT
Start: 2022-05-03 | End: 2022-05-03

## 2022-05-03 RX ORDER — EPHEDRINE SULFATE 50 MG/ML
INJECTION, SOLUTION INTRAVENOUS PRN
Status: DISCONTINUED | OUTPATIENT
Start: 2022-05-03 | End: 2022-05-03

## 2022-05-03 RX ORDER — ACETAMINOPHEN 325 MG/1
975 TABLET ORAL ONCE
Status: COMPLETED | OUTPATIENT
Start: 2022-05-03 | End: 2022-05-03

## 2022-05-03 RX ORDER — ACETAMINOPHEN 325 MG/1
975 TABLET ORAL EVERY 8 HOURS
Status: DISCONTINUED | OUTPATIENT
Start: 2022-05-03 | End: 2022-05-04 | Stop reason: HOSPADM

## 2022-05-03 RX ORDER — ENOXAPARIN SODIUM 100 MG/ML
40 INJECTION SUBCUTANEOUS EVERY 24 HOURS
Status: DISCONTINUED | OUTPATIENT
Start: 2022-05-04 | End: 2022-05-04 | Stop reason: HOSPADM

## 2022-05-03 RX ADMIN — Medication 2 G: at 07:36

## 2022-05-03 RX ADMIN — EPHEDRINE SULFATE 10 MG: 50 INJECTION, SOLUTION INTRAVENOUS at 07:37

## 2022-05-03 RX ADMIN — CEFAZOLIN SODIUM 2 G: 2 INJECTION, SOLUTION INTRAVENOUS at 15:37

## 2022-05-03 RX ADMIN — ACETAMINOPHEN 975 MG: 325 TABLET, FILM COATED ORAL at 22:09

## 2022-05-03 RX ADMIN — ONDANSETRON 4 MG: 2 INJECTION INTRAMUSCULAR; INTRAVENOUS at 12:01

## 2022-05-03 RX ADMIN — GLYCOPYRROLATE 0.2 MG: 0.2 INJECTION, SOLUTION INTRAMUSCULAR; INTRAVENOUS at 07:36

## 2022-05-03 RX ADMIN — TRAMADOL HYDROCHLORIDE 50 MG: 50 TABLET, COATED ORAL at 20:46

## 2022-05-03 RX ADMIN — SENNOSIDES AND DOCUSATE SODIUM 1 TABLET: 50; 8.6 TABLET ORAL at 20:46

## 2022-05-03 RX ADMIN — FENTANYL CITRATE 100 MCG: 50 INJECTION, SOLUTION INTRAMUSCULAR; INTRAVENOUS at 07:31

## 2022-05-03 RX ADMIN — KETOROLAC TROMETHAMINE 15 MG: 15 INJECTION, SOLUTION INTRAMUSCULAR; INTRAVENOUS at 20:46

## 2022-05-03 RX ADMIN — Medication 1 TABLET: at 17:13

## 2022-05-03 RX ADMIN — DEXAMETHASONE SODIUM PHOSPHATE 4 MG: 4 INJECTION, SOLUTION INTRA-ARTICULAR; INTRALESIONAL; INTRAMUSCULAR; INTRAVENOUS; SOFT TISSUE at 07:31

## 2022-05-03 RX ADMIN — HYDROMORPHONE HYDROCHLORIDE 0.5 MG: 1 INJECTION, SOLUTION INTRAMUSCULAR; INTRAVENOUS; SUBCUTANEOUS at 08:00

## 2022-05-03 RX ADMIN — ACETAMINOPHEN 975 MG: 325 TABLET, FILM COATED ORAL at 06:08

## 2022-05-03 RX ADMIN — ATROPINE SULFATE 0.4 MG: 0.4 INJECTION, SOLUTION INTRAMUSCULAR; INTRAVENOUS; SUBCUTANEOUS at 07:34

## 2022-05-03 RX ADMIN — PROPOFOL 70 MG: 10 INJECTION, EMULSION INTRAVENOUS at 07:41

## 2022-05-03 RX ADMIN — SODIUM CHLORIDE, POTASSIUM CHLORIDE, SODIUM LACTATE AND CALCIUM CHLORIDE: 600; 310; 30; 20 INJECTION, SOLUTION INTRAVENOUS at 06:12

## 2022-05-03 RX ADMIN — ACETAMINOPHEN 975 MG: 325 TABLET, FILM COATED ORAL at 13:37

## 2022-05-03 RX ADMIN — SODIUM CHLORIDE, POTASSIUM CHLORIDE, SODIUM LACTATE AND CALCIUM CHLORIDE: 600; 310; 30; 20 INJECTION, SOLUTION INTRAVENOUS at 11:23

## 2022-05-03 RX ADMIN — Medication 20 MG: at 07:58

## 2022-05-03 RX ADMIN — SODIUM CHLORIDE, POTASSIUM CHLORIDE, SODIUM LACTATE AND CALCIUM CHLORIDE: 600; 310; 30; 20 INJECTION, SOLUTION INTRAVENOUS at 07:28

## 2022-05-03 RX ADMIN — CELECOXIB 400 MG: 200 CAPSULE ORAL at 06:08

## 2022-05-03 RX ADMIN — TRANEXAMIC ACID 1950 MG: 650 TABLET ORAL at 06:08

## 2022-05-03 RX ADMIN — HYDROMORPHONE HYDROCHLORIDE 0.5 MG: 1 INJECTION, SOLUTION INTRAMUSCULAR; INTRAVENOUS; SUBCUTANEOUS at 07:51

## 2022-05-03 RX ADMIN — GLYCOPYRROLATE 0.2 MG: 0.2 INJECTION, SOLUTION INTRAMUSCULAR; INTRAVENOUS at 07:41

## 2022-05-03 RX ADMIN — LIDOCAINE HYDROCHLORIDE 30 MG: 10 INJECTION, SOLUTION INFILTRATION; PERINEURAL at 07:31

## 2022-05-03 RX ADMIN — HYDROMORPHONE HYDROCHLORIDE 0.5 MG: 1 INJECTION, SOLUTION INTRAMUSCULAR; INTRAVENOUS; SUBCUTANEOUS at 07:49

## 2022-05-03 RX ADMIN — ONDANSETRON HYDROCHLORIDE 4 MG: 2 INJECTION, SOLUTION INTRAVENOUS at 07:50

## 2022-05-03 RX ADMIN — PROPOFOL 130 MG: 10 INJECTION, EMULSION INTRAVENOUS at 07:31

## 2022-05-03 ASSESSMENT — ACTIVITIES OF DAILY LIVING (ADL)
DESCRIBE_HEARING_LOSS: BILATERAL HEARING LOSS
WALKING_OR_CLIMBING_STAIRS_DIFFICULTY: YES
WEAR_GLASSES_OR_BLIND: YES
WERE_AUXILIARY_AIDS_OFFERED?: NO
WALKING_OR_CLIMBING_STAIRS: AMBULATION DIFFICULTY, ASSISTANCE 1 PERSON
FALL_HISTORY_WITHIN_LAST_SIX_MONTHS: NO
DRESSING/BATHING_DIFFICULTY: NO
DOING_ERRANDS_INDEPENDENTLY_DIFFICULTY: NO
TRANSFERRING: 0-->ASSISTANCE NEEDED (DEVELOPMETNALLY APPROPRIATE)
CONCENTRATING,_REMEMBERING_OR_MAKING_DECISIONS_DIFFICULTY: NO
DIFFICULTY_EATING/SWALLOWING: NO
TRANSFERRING: 1-->ASSISTANCE (EQUIPMENT/PERSON) NEEDED
THE_FOLLOWING_AIDS_WERE_PROVIDED;: PATIENT DECLINED OFFER OF AUXILIARY AIDS
DIFFICULTY_COMMUNICATING: NO
PATIENT'S_PREFERRED_MEANS_OF_COMMUNICATION: VERBAL
TOILETING_ISSUES: NO
HEARING_DIFFICULTY_OR_DEAF: YES
USE_OF_HEARING_ASSISTIVE_DEVICES: BILATERAL HEARING AIDS
VISION_MANAGEMENT: GLASSES HERE

## 2022-05-03 NOTE — ANESTHESIA POSTPROCEDURE EVALUATION
Patient: Ru Coombs    Procedure: Procedure(s):  Left total knee arthroplasty       Anesthesia Type:  General    Note:  Disposition: Inpatient   Postop Pain Control: Uneventful            Sign Out: Well controlled pain   PONV: No   Neuro/Psych: Uneventful            Sign Out: Acceptable/Baseline neuro status   Airway/Respiratory: Uneventful            Sign Out: Acceptable/Baseline resp. status   CV/Hemodynamics: Uneventful            Sign Out: Acceptable CV status; No obvious hypovolemia; No obvious fluid overload   Other NRE: NONE   DID A NON-ROUTINE EVENT OCCUR? No           Last vitals:  Vitals Value Taken Time   /78 05/03/22 0955   Temp     Pulse 57 05/03/22 1003   Resp 13 05/03/22 1003   SpO2 97 % 05/03/22 1003   Vitals shown include unvalidated device data.    Electronically Signed By: Jori García MD  May 3, 2022  10:05 AM

## 2022-05-03 NOTE — BRIEF OP NOTE
[facility]    Brief Operative Note    Pre-operative diagnosis: Primary osteoarthritis of left knee [M17.12]  Post-operative diagnosis left knee DJD    Procedure: Procedure(s):  Left total knee arthroplasty  Surgeon: Surgeon(s) and Role:     * Michael Bacon MD - Primary     * Garcia Mora PA-C - Assisting  Anesthesia: General   Estimated Blood Loss: Less than 10 ml    Drains: Hemovac  Specimens: * No specimens in log *  Findings:   None.  Complications: None.  Implants:   Implant Name Type Inv. Item Serial No.  Lot No. LRB No. Used Action   BONE CEMENT SIMPLEX FULL DOSE 6191-1-001 - [id number] Cement, Bone BONE CEMENT SIMPLEX FULL DOSE 6191-1-001  [employer] [id number] Left 1 Implanted   IMP COMP FEMORAL S&N JADON II NP CR SZ 8 LT [id number] - [id number] Total Joint Component/Insert IMP COMP FEMORAL S&N JADON II NP CR SZ 8 LT [id number]  [employer] [id number] Left 1 Implanted   IMP BASEPLATE TIBIAL JADON II SZ 7 LT TI [id number] - [id number] Total Joint Component/Insert IMP BASEPLATE TIBIAL JADON II SZ 7 LT TI [id number]  [employer] [id number] Left 1 Implanted   IMP COMP PATELLA SNN GEN II RESURFACING 38MM [id number] - [id number] Total Joint Component/Insert IMP COMP PATELLA SNN GEN II RESURFACING 38MM [id number]  [employer] [id number] Left 1 Implanted   IMP INSERT ARTICULAR S&N LGN CR XLPE SZ7-8 9MM [id number] - [id number] Total Joint Component/Insert IMP INSERT ARTICULAR S&N LGN CR XLPE SZ7-8 9MM [id number]  [employer] [id number] Left 1 Implanted
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
## 2022-05-03 NOTE — PROGRESS NOTES
"   05/03/22 1559   Quick Adds   Type of Visit Initial PT Evaluation   Living Environment   People in Home spouse   Current Living Arrangements house   Home Accessibility stairs to enter home;stairs within home   Number of Stairs, Main Entrance none   Living Environment Comments Patient lives in 2 story home, no stairs to enter, lives on main level.  Wife is retired, will be at home to assist during the day.   Self-Care   Usual Activity Tolerance good   Equipment Currently Used at Home crutches;walker, rolling   Activity/Exercise/Self-Care Comment Patient reports limping prior to surgery; reports being independent with mobility   General Information   Onset of Illness/Injury or Date of Surgery 05/03/22   Referring Physician Michael Bacon MD   Patient/Family Therapy Goals Statement (PT) return to home   Pertinent History of Current Problem (include personal factors and/or comorbidities that impact the POC) POD #0 s/p left TKA   Cognition   Orientation Status (Cognition) oriented x 4   Pain Assessment   Patient Currently in Pain Yes, see Vital Sign flowsheet   Integumentary/Edema   Integumentary/Edema Comments ace bandage from foot to groin   Posture    Posture Not impaired   Range of Motion (ROM)   Range of Motion ROM deficits secondary to surgical procedure   ROM Comment Able to demonstrate approx 75 degrees left knee flexion in sitting.   Strength (Manual Muscle Testing)   Strength (Manual Muscle Testing) Able to perform L SLR   Bed Mobility   Comment, (Bed Mobility) Independent   Transfers   Comment, (Transfers) Patient transferred between sitting and standing with 2WW and CGA.   Gait/Stairs (Locomotion)   Distance in Feet (Required for LE Total Joints) 40   Comment, (Gait/Stairs) Patient amb 40 feet with 2WW and CGA.   Balance   Balance Comments Patient reported feeling \"very shaky\" during gait, limited gait distance secondary to shakiness.  Patient reliant on UE support at walker during session, CGA by " therapist with all activity   Clinical Impression   Criteria for Skilled Therapeutic Intervention Yes, treatment indicated   PT Diagnosis (PT) Impaired functional mobility   Influenced by the following impairments pain, decreased activity tolerance, decreased strength   Functional limitations due to impairments difficulties with transfers, gait   Clinical Presentation (PT Evaluation Complexity) Stable/Uncomplicated   Clinical Presentation Rationale moderately complex pmh, stable presentation, good social support   Clinical Decision Making (Complexity) low complexity   Planned Therapy Interventions (PT) balance training;bed mobility training;gait training;home exercise program;patient/family education;ROM (range of motion);stair training;strengthening;transfer training;progressive activity/exercise   Anticipated Equipment Needs at Discharge (PT) walker, rolling   Risk & Benefits of therapy have been explained evaluation/treatment results reviewed;care plan/treatment goals reviewed;risks/benefits reviewed;current/potential barriers reviewed;participants voiced agreement with care plan;participants included;patient   PT Discharge Planning   PT Discharge Recommendation (DC Rec)   (per ortho md)   PT Rationale for DC Rec Anticipate that with ongoing medical management and skilled IP PT, patient will demonstrate functional, household mobility with SBA and least restrictive assistive device   Physical Therapy Goals   PT Frequency 2x/day   PT Predicted Duration/Target Date for Goal Attainment 05/04/22   PT Goals Transfers;Gait;Stairs   PT: Transfers Sit to/from stand;Bed to/from chair;Assistive device;Supervision/stand-by assist   PT: Gait Supervision/stand-by assist;Assistive device;150 feet   PT: Stairs Supervision/stand-by assist;1 stair;Assistive device

## 2022-05-03 NOTE — ANESTHESIA CARE TRANSFER NOTE
Patient: Ru Coombs    Procedure: Procedure(s):  Left total knee arthroplasty       Diagnosis: Primary osteoarthritis of left knee [M17.12]  Diagnosis Additional Information: No value filed.    Anesthesia Type:   General     Note:    Oropharynx: oropharynx clear of all foreign objects  Level of Consciousness: awake and drowsy  Oxygen Supplementation: face mask  Level of Supplemental Oxygen (L/min / FiO2): 6  Independent Airway: airway patency satisfactory and stable  Dentition: dentition unchanged  Vital Signs Stable: post-procedure vital signs reviewed and stable  Report to RN Given: handoff report given  Patient transferred to: PACU    Handoff Report: Identifed the Patient, Identified the Reponsible Provider, Reviewed the pertinent medical history, Discussed the surgical course, Reviewed Intra-OP anesthesia mangement and issues during anesthesia, Set expectations for post-procedure period and Allowed opportunity for questions and acknowledgement of understanding      Vitals:  Vitals Value Taken Time   BP     Temp     Pulse     Resp     SpO2         Electronically Signed By: KAYDEN Lara CRNA  May 3, 2022  9:17 AM

## 2022-05-03 NOTE — INTERVAL H&P NOTE
"I have reviewed the surgical (or preoperative) H&P that is linked to this encounter, and examined the patient. There are no significant changes    Clinical Conditions Present on Arrival:  Clinically Significant Risk Factors Present on Admission                  # Platelet Defect: home medication list includes an antiplatelet medication  # Overweight: Estimated body mass index is 27.62 kg/m  as calculated from the following:    Height as of this encounter: 1.753 m (5' 9\").    Weight as of this encounter: 84.8 kg (187 lb).       "

## 2022-05-03 NOTE — PLAN OF CARE
Goal Outcome Evaluation:    Plan of Care Reviewed With: patient, spouse     Overall Patient Progress: improving     Pt arrived from pacu on a cart, assisted into bed with air vicki. IV infusing into right arm, O2 on at 2L NC piggybacked into capno tubing with readings wnl except for his HR which is running ava since recovery. Hemavac drain in place, due to void having been scanned for 314cc before coming up. Ace wrap to left left clean and dry, immobilizer in place on arrival, removed to apply PCDs to both legs and new ice bag applied. CMS to his left foot and leg intact. Frequent VS started and pt instructed to do postop exercises with each check. Oriented to room, equipment, orders and floor routines. Pt scheduled for PT at 1545 today. The plan is to go home tomorrow with his spouse after therapies and the PA rounding.

## 2022-05-03 NOTE — OP NOTE
"May 3, 2022    Pre Operative Diagnosis: Left Knee DJD  Post operative Diagnosis: Left Knee DJD  Title of the Procedure: Left Total Knee Arthroplasty ( Jim and nephew #8 PCR femur; #7 tibia; 38 mm patella and 9 mm tibial insert  )  Anesthesia: general  Surgeon: Michael Bacon M.D.  Assistant:ÁNGEL Brown    Assistance from the PA was necessary for this case due to the complexity of the procedure. PA helped with satisfactory exposure of the bones, protecting vital neurovascular and ligamentous structures. He also helped with maintaining the instruments for bone osteotomy and subsequent implantation of the components. He also performed wound closure and placement of postoperative dressing.    Drain: Hemovac    Indication of the Procedure:    This patient is a 71 y.o. male who has had chronic knee pain with progressively worsening due to advanced osteoarthritis. All reasonable and appropriate non-operative treatments have been tried. The knee pain is severe to the point of affecting this patient's day-to-day activities and There has been disturbances of sleeping. With understanding of the available options including further observation, nature of the surgery and potential complications of the surgery, total knee arthroplasty is chosen to be carried out today. As far as potential complications are concerned, emphasis has been placed on infection, deep vein thrombosis, arthrofibrosis, aiyana-prosthetic fractures, loosening of the components, unexpected anesthetic complications as well as persisting pain that cannot be explained.    Description of the procedure:  After satisfactory anesthesia was administered, the correct lower extremity was then prepped and draped in a routine sterile fashion in a supine position. The \"time out\" was then carried out per protocol. At this time administration of preoperative prophylactic antibiotic was confirmed.    Following exsanguination, the  tourniquet was then inflated to 300 mm Hg. A " midline longitudinal incision was made from just above the patella  down to the tibial tubercle. A sharp dissection was carried through the subcutaneous tissue and subsequently,  a medial parapatellar arthrotomy was carried out entering the knee joint. The patella was then displaced laterally and knee was flexed. All osteophytes were removed from the femur,  tibia and patella.    After placement of the intramedullary grecia into the femur distal femoral cut was made using 5  valgus angle. After measurement of the size, the rest of the femoral cuts were made;anterior and posterior as well as chamfer.The trial femoral component was then applied and noted that fitting was satisfactory.    Using the extra medullary alignment guide, a perpendicular osteotomy was made on the proximal tibia. Using the appropriately sized tibial trial components, alignment was assessed using the alignment grecia. It was felt that a normal medical axis from the hip joint to the ankle joint was reestablished when all trial components were placed. Valgus and varus stress were applied and soft tissue tension was evaluated at this time. It was also confirmed there is a full extension and full flexion with gravity alone. The space for the tibial keel was then punched into the proximal tibia.    Subsequently, the patella was osteotomized after measurement of the thickness. The size was measured and the pegs were drilled. Patellofemoral tracking was then evaluated and noted to be satisfactory.    All osteotomized bone surfaces were thoroughly irrigated with the jet lavage system. The bone cement was mixed and components were placed while the bone cement was doughy.Excess cement was removed from components. When the bone cement became completely hard, another trial reduction was carried out to determine the thickness of the tibial polyethylene component. The final polyethylene tibial component was then inserted.    With further irrigation of the knee  joint a medium Hemovac drain was placed. The wound was closed In layers in a routine fashion.The skin layer was closed with staples.A routine compression soft dressing was applied and the knee immobilizer was applied. Tourniquet was deflated at the end of the procedure.  The needle and sponge counts were correct at the end of the case. Blood loss was minimum. No intraoperative complications were identified at the time of finishing the operation.    Michael Bacon M.D.

## 2022-05-04 ENCOUNTER — APPOINTMENT (OUTPATIENT)
Dept: OCCUPATIONAL THERAPY | Facility: CLINIC | Age: 72
End: 2022-05-04
Attending: ORTHOPAEDIC SURGERY
Payer: COMMERCIAL

## 2022-05-04 ENCOUNTER — APPOINTMENT (OUTPATIENT)
Dept: PHYSICAL THERAPY | Facility: CLINIC | Age: 72
End: 2022-05-04
Attending: ORTHOPAEDIC SURGERY
Payer: COMMERCIAL

## 2022-05-04 VITALS
SYSTOLIC BLOOD PRESSURE: 132 MMHG | HEART RATE: 51 BPM | OXYGEN SATURATION: 95 % | TEMPERATURE: 97.9 F | BODY MASS INDEX: 27.7 KG/M2 | RESPIRATION RATE: 10 BRPM | WEIGHT: 187 LBS | HEIGHT: 69 IN | DIASTOLIC BLOOD PRESSURE: 68 MMHG

## 2022-05-04 LAB
FASTING STATUS PATIENT QL REPORTED: YES
GLUCOSE BLD-MCNC: 100 MG/DL (ref 70–99)
HGB BLD-MCNC: 13.4 G/DL (ref 13.3–17.7)

## 2022-05-04 PROCEDURE — 36415 COLL VENOUS BLD VENIPUNCTURE: CPT | Performed by: PHYSICIAN ASSISTANT

## 2022-05-04 PROCEDURE — 250N000011 HC RX IP 250 OP 636: Performed by: ORTHOPAEDIC SURGERY

## 2022-05-04 PROCEDURE — 97165 OT EVAL LOW COMPLEX 30 MIN: CPT | Mod: GO

## 2022-05-04 PROCEDURE — 97116 GAIT TRAINING THERAPY: CPT | Mod: GP | Performed by: PHYSICAL THERAPIST

## 2022-05-04 PROCEDURE — 97110 THERAPEUTIC EXERCISES: CPT | Mod: GP | Performed by: PHYSICAL THERAPIST

## 2022-05-04 PROCEDURE — 250N000011 HC RX IP 250 OP 636: Performed by: PHYSICIAN ASSISTANT

## 2022-05-04 PROCEDURE — 250N000013 HC RX MED GY IP 250 OP 250 PS 637: Performed by: PHYSICIAN ASSISTANT

## 2022-05-04 PROCEDURE — 85018 HEMOGLOBIN: CPT | Performed by: PHYSICIAN ASSISTANT

## 2022-05-04 PROCEDURE — 96372 THER/PROPH/DIAG INJ SC/IM: CPT | Performed by: PHYSICIAN ASSISTANT

## 2022-05-04 PROCEDURE — 82947 ASSAY GLUCOSE BLOOD QUANT: CPT | Performed by: ORTHOPAEDIC SURGERY

## 2022-05-04 PROCEDURE — 97535 SELF CARE MNGMENT TRAINING: CPT | Mod: GO

## 2022-05-04 RX ADMIN — ENOXAPARIN SODIUM 40 MG: 40 INJECTION SUBCUTANEOUS at 06:34

## 2022-05-04 RX ADMIN — TRAMADOL HYDROCHLORIDE 50 MG: 50 TABLET, COATED ORAL at 09:35

## 2022-05-04 RX ADMIN — ACETAMINOPHEN 975 MG: 325 TABLET, FILM COATED ORAL at 06:16

## 2022-05-04 RX ADMIN — SENNOSIDES AND DOCUSATE SODIUM 1 TABLET: 50; 8.6 TABLET ORAL at 07:33

## 2022-05-04 RX ADMIN — Medication 1 TABLET: at 07:33

## 2022-05-04 RX ADMIN — CEFAZOLIN SODIUM 2 G: 2 INJECTION, SOLUTION INTRAVENOUS at 00:17

## 2022-05-04 RX ADMIN — POLYETHYLENE GLYCOL 3350 17 G: 17 POWDER, FOR SOLUTION ORAL at 07:34

## 2022-05-04 RX ADMIN — Medication 1 LOZENGE: at 00:17

## 2022-05-04 RX ADMIN — KETOROLAC TROMETHAMINE 15 MG: 15 INJECTION, SOLUTION INTRAMUSCULAR; INTRAVENOUS at 07:35

## 2022-05-04 ASSESSMENT — ACTIVITIES OF DAILY LIVING (ADL): PREVIOUS_RESPONSIBILITIES: MEAL PREP;HOUSEKEEPING;MEDICATION MANAGEMENT;DRIVING

## 2022-05-04 NOTE — DISCHARGE INSTRUCTIONS
POST OP TOTAL KNEE INSTRUCTIONS:    Medication:     Pain medication:  You will be discharged from the hospital with prescription opioid pain medication(s); Tramadol. In most cases, consistent use of this type of pain medication can be limited to a few days. After this period, the medication should be weaned off by decreasing the dose and/or increasing time between doses, as soon as possible to avoid potential complications of constipation, nausea, or rash, etc.   As you taper off the pain pills, you may find using them only at nighttime and then controlling the pain with over-the-counter medication(s) such as Ibuprofen OR Naproxen sodium (but not bother) and Tylenol during the day, would be a good way of managing the pain.  NOTE:  if you were prescribed Celebrex/Celecoxhib, Do NOT start Ibuprofen (advil/Motrin) or Naproxen (Naprosyn/Alleve) until done taking it.    In order to minimize the potential problem of blood clot:  Take the daily 325 mg aspirin.    In order to prevent constipation:  You may also be sent home with stool softener, it is recommended you take this until your bowel movements become normal for you.  You may also need to add a laxative to the routine if you have not had a bowel movement for several days following surgery.  Also, Increase water and fiber intake while on pain medication to help prevent constipation.       Other medications prior to surgery:  consult the medication list in your discharge instructions for any changes.    Activity: You will be using a walker or crutches until you feel confident. You also need to use the knee immobilizer until you are able to straight leg raise 10-15 times. Gradual rather than sudden increase of walking distance is recommended as the comfort level dictates. A cane instead of walker or crutches can be used when your strength and balance are improved.    Dressing: *(see below as well).  Typically, the first dressing change will be done in the hospital  before discharge.   If not, please perform this at home on the 3rd day after surgery.  Showers can be taken with plastic covering over the a non-water proof post surgical dressing during this period. After that, the incision site can be wet for showering but should not be soaked. A light gauze dressing along with paper tape should be placed over the wound after each shower. The staples will be removed 10-14 days from the operation. During that time it is best to cover the incision site to protect the staples from being pulled or snagged.    *If you were discharged with an aquacell dressing, (flesh colored rubber like bandage), you may leave this dressing in place until your follow up appointment in the clinic, unless the dressing; becomes completely saturated, is leaking, gets water inside as evident by moisture appearing on the inside, or you have a skin reaction.  In this case you should remove it and use dressings like what is mentioned in the paragraph above.    Exercises: It is recommended that you do the flexibility exercises (range of motion) and strengthening exercises in a small amounts frequently throughout the day rather than infrequent long sessions. Being overly aggressive with the exercises can hinder rather than help. We are looking for a gradual steady improvement even though the general goal for range of motion post-operatively is 0-90 degrees of bending within the first 2 weeks. During this time, gaining full Extension (straightening) is far more important than gaining flexion. Generally, you be working with a physical therapist 2-3 times per week for the first 2-4 weeks.    5 keys to the knee,- to be done throughout the day:  1. Knee Bending: Dangle- in a seated position where foot can swing, let the leg from the knee down dangle off the edge of bed or table. You may use the other leg/foot to gently push the affected leg into more bending.  2. Knee straightening: Heel blocking - While sitting or  laying, place pillow, ottoman or some other support under the heel with toes pointing up but ankle relaxed.  Rest there, working up to 10 minutes relaxing the leg to induce knee straightening.  Once relaxed, push/squeeze the knee towards the floor, hold for 2 seconds, and relax. Repeat x 10.    NOTE:  Alternate 1 and 2 every other hour.  3. Straight leg raises 3x daily: from a laying or sitting position, while keeping the knee locked straight, slowly lift the foot up 12-16 inches and hold for 2 seconds and relax.  Repeat working up to 20 repetitions.    4. Ice and elevate: whenever you are not up and about.  No standing around or sitting with the knee bend for more than very short periods.  5. Walk:  2-5 minutes of short laps every couple hours.  Use assistive devices as needed such as walker or cane to promote safety.  Walk focusing more on good form than getting somewhere.  Lead with the knee and then straighten the knee trying to make the surgical leg move like normal knee.    Soft tissue: It is not unusual to have swelling in the leg; thigh down to through the toes for several months from the surgery.  Frequent ankle pumping, elevation of the leg above the heart level and gentle compression support with ace wrap should help with swelling. If you wrap the leg, start at the toes and end in the thigh.  Icing regularly, for 20 minutes every hour, will also help with swelling and pain.  Flushing (pink) of the tissue is also often noted due to excess blood flow for healing.   You may also experience bruising.  This may occur anywhere from your toes, through the leg and even onto your torso.  It may show up even 1 week after surgery.     Progressively worsening redness along with increasing pain or increasing drainage from the wound should be a reason to alert us immediately.     Follow up in clinic within 14 days after surgery.  May call [phone] to schedule.    Garcia Mora PA-C  [facility] - Surgery  [facility]  [address] [place]  [address]  [phone]

## 2022-05-04 NOTE — DISCHARGE SUMMARY
[facility]  Discharge Summary            Interval History:     71 year old male admitted postoperatively for elective Left TKA  with Dr. Michael Bacon due to DJD unresponsive to non operative treatment.  There were no notable intraoperative complications.  Patient being discharged post op Day #1.  Ru Coombs received skilled nursing, PT, OT, SW inquiry.  There was no Hospitalist care during the stay.     Ru Coombs has progressed satisfactorily with ambulation and pain management and without medical complication.  Patient is confident in their discharge and has  met goals with PT and OT. Pt lives at home with family and will be discharged to home based on home living conditions and level of support.  Ru Coombs leaves the hospital in stable condition with good chance for recovery.  Today: doing well.  Pain tolerable, eating, ambulating, voiding. No issues.    Pain: tolerable.   Nausea: none.  Light headedness : none  Chest pain: none  Shortness of breath: none              Assessment and Plan:     S/P Left TKA Day #1.  Final Diagnosis: same.  VSS, Hemodynamically asymptomatic, pain management adequate.    PLAN:  DVT prophylaxis with daily 325 mg aspirin., as patient has no history of VTE.  Pain management with tramadol, tylenol, advil.  Bowel regimen.  PT as out patient.  Patient instructions attached to discharge.      Discharge to home.  Follow up in clinic as previously scheduled, approx 10-14 days post op.    [facility]  [address] [place]  [address]  [phone]  [phone] fax  [phone] for scheduling                      Physical Exam:   Patient Vitals for the past 12 hrs:   BP Temp Temp src Pulse Resp SpO2   05/04/22 0714 132/68 97.9  F (36.6  C) Oral 51 10 95 %   05/03/22 2351 124/54 97.6  F (36.4  C) Oral 50 16 97 %   05/03/22 2131 -- -- -- -- 14 95 %     Hemoglobin   Date Value Ref Range Status   05/04/2022 13.4 13.3 - 17.7 g/dL Final   04/15/2022 14.6  13.3 - 17.7 g/dL Final   08/07/2015 14.1 13.3 - 17.7 g/dL Final   03/23/2011 14.6 13.3 - 17.7 g/dL Final       Patient is alert and oriented.  Vitals: stable  Neurovascular status: Grossly intact to LLE, SLR able.  Dressing: clean and dry  Calves: soft and non tender          Garcia Mora PA-C  [facility] - Surgery    5/4/2022

## 2022-05-04 NOTE — PLAN OF CARE
Patient vital signs are at baseline: Yes, on capnography, sats 90's.  Lungs clear, using inspirometer.  Patient able to ambulate as they were prior to admission or with assist devices provided by therapies during their stay:  Yes, stood up at bedside and ambulated in hallway with walker, gait belt and 1 assist.  CMS intact, dressing dry, drain intact.  Patient MUST void prior to discharge:  Yes, voiding in good amounts.  Patient able to tolerate oral intake:  Yes, tolerated diet, no nausea.  Pain has adequate pain control using Oral analgesics:  Yes, pain controlled with tylenol, iv tordol and ultram, ice pack application.  Does patient have an identified :  Yes, spouse.  Has goal D/C date and time been discussed with patient:  Yes, plan to discharge to home tomorrow.  Care plan reviewed with pt.

## 2022-05-04 NOTE — PROGRESS NOTES
05/04/22 0915   Quick Adds   Type of Visit Initial Occupational Therapy Evaluation   Living Environment   People in Home spouse   Current Living Arrangements house   Transportation Anticipated car, drives self;family or friend will provide   Living Environment Comments Pt lives with spouse in house, no MARGUERITE, all needs met on main level, walk in shower, shower chair, standard height toilet.   Self-Care   Usual Activity Tolerance good   Current Activity Tolerance moderate   Equipment Currently Used at Home crutches;walker, rolling   Fall history within last six months no   Activity/Exercise/Self-Care Comment Pt reports indep in all ADLs, IADLs and mobility tasks with no AD at baseline.   Instrumental Activities of Daily Living (IADL)   Previous Responsibilities meal prep;housekeeping;medication management;driving   IADL Comments Shared household tasks with spouse   General Information   Onset of Illness/Injury or Date of Surgery 05/03/22   Referring Physician Garcia Mora, PA-C   Patient/Family Therapy Goal Statement (OT) Pt's goal is to d/c home   Additional Occupational Profile Info/Pertinent History of Current Problem Per chart: Pt is a 71 year old male s/p L TKA   Existing Precautions/Restrictions fall   Left Lower Extremity (Weight-bearing Status) weight-bearing as tolerated (WBAT)   Pain Assessment   Patient Currently in Pain Yes, see Vital Sign flowsheet  (pain in LLE)   Bed Mobility   Bed Mobility supine-sit;sit-supine   Supine-Sit [place] (Bed Mobility) supervision   Sit-Supine [place] (Bed Mobility) supervision   Transfers   Transfers sit-stand transfer   Sit-Stand Transfer   Sit-Stand [place] (Transfers) contact guard   Assistive Device (Sit-Stand Transfers) walker, front-wheeled   Balance   Balance Comments CGA/SBA while in stance FWW level   Activities of Daily Living   BADL Assessment/Intervention lower body dressing;toileting   Lower Body Dressing Assessment/Training    [place] Level (Lower Body Dressing) minimum assist (75% patient effort)   Toileting   [place] Level (Toileting) contact guard assist   Clinical Impression   Criteria for Skilled Therapeutic Interventions Met (OT) Yes, treatment indicated   OT Diagnosis Impaired ADLs, IADLs and mobility tasks   OT Problem List-Impairments impacting ADL problems related to;activity tolerance impaired;pain   ADL comments/analysis Pt below baseline level of functioning in daily tasks   Assessment of Occupational Performance 5 or more Performance Deficits   Identified Performance Deficits Bathing, dressing, grooming, toileting, homemaking, transfers   Planned Therapy Interventions (OT) ADL retraining;IADL retraining;strengthening;transfer training   Clinical Decision Making Complexity (OT) low complexity   Risk & Benefits of therapy have been explained evaluation/treatment results reviewed;care plan/treatment goals reviewed;risks/benefits reviewed;current/potential barriers reviewed;participants voiced agreement with care plan;participants included;patient   OT Discharge Planning   OT Discharge Recommendation (DC Rec)   (Defer to ortho)   OT Rationale for DC Rec Anticipate pt may require assist for LB dressing and higher level IADLs (driving, laundry,etc). Pt has support of spouse in home environment as needed.   Total Evaluation Time (Minutes)   Total Evaluation Time (Minutes) 5   OT Goals   Therapy Frequency (OT) One time eval and treatment   OT Predicted Duration/Target Date for Goal Attainment 05/04/22   OT Goals Hygiene/Grooming;Lower Body Dressing;Toilet Transfer/Toileting;OT Goal 1   OT: Hygiene/Grooming supervision/stand-by assist;using adaptive equipment;while standing   OT: Lower Body Dressing Minimal assist;using adaptive equipment   OT: Toilet Transfer/Toileting Supervision/stand-by assist;toilet transfer;cleaning and garment management;using adaptive equipment   OT: Goal 1 Pt will perform walk in shower transfer  with CGA in prep for safe transfer in discharge environment

## 2022-05-04 NOTE — PLAN OF CARE
Goal Outcome Evaluation:    Patient vital signs are at baseline: Yes  Patient able to ambulate as they were prior to admission or with assist devices provided by therapies during their stay:  Yes, A1 walker/GB  Patient MUST void prior to discharge:  Yes  Patient able to tolerate oral intake:  Yes, regular diet  Pain has adequate pain control using Oral analgesics:  Yes, minimal pain, denied pain medications  Does patient have an identified :  Yes, wife  Has goal D/C date and time been discussed with patient:  Yes    Pt A/O x4. Dressing to left knee CDI. CMS intact. Hemovac to left knee intact with moderate bloody output. Plan to have PT/OT today and discharge home. Will continue to monitor.

## 2022-05-04 NOTE — PLAN OF CARE
Occupational Therapy Discharge Summary    Reason for therapy discharge:    Discharged to home.    Progress towards therapy goal(s). See goals on Care Plan in [facility] electronic health record for goal details.  Goals met    Therapy recommendation(s):    Defer to ortho MD.

## 2022-05-04 NOTE — PLAN OF CARE
Physical Therapy Discharge Summary    Reason for therapy discharge:    All goals and outcomes met, no further needs identified.    Progress towards therapy goal(s). See goals on Care Plan in [facility] electronic health record for goal details.  Goals met    Therapy recommendation(s):    Defer to ortho

## 2022-05-06 ENCOUNTER — TELEPHONE (OUTPATIENT)
Dept: ORTHOPEDICS | Facility: CLINIC | Age: 72
End: 2022-05-06
Payer: COMMERCIAL

## 2022-05-06 NOTE — TELEPHONE ENCOUNTER
NA / LVM - Surgical follow up call: Left non descript, confirming follow up and left phone number for questions.    Garcia Mora PA-C  [facility] - Surgery

## 2022-05-09 ENCOUNTER — TELEPHONE (OUTPATIENT)
Dept: ORTHOPEDICS | Facility: CLINIC | Age: 72
End: 2022-05-09

## 2022-05-09 ENCOUNTER — THERAPY VISIT (OUTPATIENT)
Dept: PHYSICAL THERAPY | Facility: CLINIC | Age: 72
End: 2022-05-09
Payer: COMMERCIAL

## 2022-05-09 DIAGNOSIS — M25.551 HIP PAIN, RIGHT: ICD-10-CM

## 2022-05-09 DIAGNOSIS — Z96.652 S/P TOTAL KNEE ARTHROPLASTY, LEFT: Primary | ICD-10-CM

## 2022-05-09 PROCEDURE — 97110 THERAPEUTIC EXERCISES: CPT | Mod: GP | Performed by: PHYSICAL THERAPIST

## 2022-05-09 PROCEDURE — 97161 PT EVAL LOW COMPLEX 20 MIN: CPT | Mod: GP | Performed by: PHYSICAL THERAPIST

## 2022-05-09 ASSESSMENT — ACTIVITIES OF DAILY LIVING (ADL)
RAW_SCORE: 25
HOW_WOULD_YOU_RATE_THE_OVERALL_FUNCTION_OF_YOUR_KNEE_DURING_YOUR_USUAL_DAILY_ACTIVITIES?: ABNORMAL
RISE FROM A CHAIR: ACTIVITY IS SOMEWHAT DIFFICULT
KNEEL ON THE FRONT OF YOUR KNEE: I AM UNABLE TO DO THE ACTIVITY
SWELLING: THE SYMPTOM AFFECTS MY ACTIVITY SEVERELY
WEAKNESS: THE SYMPTOM AFFECTS MY ACTIVITY MODERATELY
SQUAT: ACTIVITY IS VERY DIFFICULT
LIMPING: I HAVE THE SYMPTOM BUT IT DOES NOT AFFECT MY ACTIVITY
STIFFNESS: THE SYMPTOM AFFECTS MY ACTIVITY SEVERELY
PAIN: THE SYMPTOM AFFECTS MY ACTIVITY SEVERELY
AS_A_RESULT_OF_YOUR_KNEE_INJURY,_HOW_WOULD_YOU_RATE_YOUR_CURRENT_LEVEL_OF_DAILY_ACTIVITY?: ABNORMAL
GO UP STAIRS: ACTIVITY IS FAIRLY DIFFICULT
SIT WITH YOUR KNEE BENT: ACTIVITY IS SOMEWHAT DIFFICULT
GIVING WAY, BUCKLING OR SHIFTING OF KNEE: THE SYMPTOM AFFECTS MY ACTIVITY SEVERELY
KNEE_ACTIVITY_OF_DAILY_LIVING_SUM: 25
GO DOWN STAIRS: ACTIVITY IS FAIRLY DIFFICULT
STAND: ACTIVITY IS FAIRLY DIFFICULT
WALK: ACTIVITY IS FAIRLY DIFFICULT
KNEE_ACTIVITY_OF_DAILY_LIVING_SCORE: 35.71

## 2022-05-09 NOTE — TELEPHONE ENCOUNTER
Patient had L TKA on 5/3/22 by Dr. Bacon.     Phone call to patient and wife, Susana, on speaker phone.   Patient states he is trying not to take any narcotics. He had only taken Tramadol once a day at bedtime and took the last dose on 5/6/22. His wife was also concerned he is not sleeping much now that he stopped the Tramadol.   He is using his own supply of Ibuprofen 200mg tablets, taking 3 tabs every 6 hrs. He alternates it with Tylenol approximately 650mg every 4 hrs. He is breaking a 500mg in 1/4ths trying to get to 650mg. He is also taking 325mg aspirin daily.     He states his pain has subsided. However, he still has a constant achy pain from his knee that radiates down his lower leg to his ankle, despite some position changes. He rates his pain as +5/10. He denies calf pain or any firm spots in his calf.   There is quite a bit of swelling of his lower leg, and Susana states it feels very tight.   He is icing every 2-3 hours but has not been elevating his leg at heart level, except at bedtime. Otherwise, it is mostly hip level at rest.   Recommended he elevate his foot at or above heart level whenever he is at rest to help get the swelling and pain levels down.     He asks if there is another alternative medication that he can take to help with the pain. Discussed that while writer understands he does not like taking narcotics, he had a major surgery and will have some significant pain afterwards. Gave him permission to take the Tramadol as there is no other OTC medication besides Aleve that he can take.  Also discussed that we want him to be able to do the exercises necessary for his recovery and be able to manage his pain at the same time.   He then stated he had his first physical therapy appointment today in about 45 minutes. He asked if it was ok to go to that.   Encouraged him to keep the appointment and be evaluated by physical therapy.   Asked that he call back if he has further questions. He verbalized  marjorie.     ELIZABETH Regan RN

## 2022-05-09 NOTE — TELEPHONE ENCOUNTER
Reason for call:  Ru had a tka.He has questions about pain medication. He has taken the  Max amount of tylenol and ibuprofen he can take and would like to not have to take the steroids. He has continual pain in lower leg and ankle. Grace call back to discuss alternatives.  Phone number to reach patient:  Home number on file [phone] (home)    Best Time:  any    Can we leave a detailed message on this number?  NO

## 2022-05-09 NOTE — PROGRESS NOTES
Physical Therapy Initial Evaluation  May 9, 2022     Precautions/Restrictions/MD instructions: PT eval and treat.    Subjective:   Date of Onset: 5/3/22  C/C: left knee pain and stiffness.   Quality of pain is dull and aching. Pains are described as intermittent in nature. Pain is worse: after periods of rest. Pain is rated 5/10.   History of symptoms: Pains began gradually as the result of unknown origins. He underwent a Left sided TKA as a result. Since onset, symptoms are improving.  Worsened by: sit<>stand, walking, stairs   Alleviated by: Rest, Pain medications- tramadol, PT and Surgery.  FWW,  General health as reported by patient: excellent  Pertinent medical/surgical history: history of a hernia and appendix surgery.    Imaging: x-ray. Current occupational status: retired. Patient's goals are: decrease pain, improve tolerance to walking, stairs, standing. Return to MD:  PRN.        Objective:  KNEE:    PROM:   L    Hyperextension na   Extension Lacking 6   Flexion 82     Incision: clean and covered with sterile bandage  Edema: moderate pitting edema surrounding knee and ankle       Functional:    Sit<>Stand: independent  Sit<>supine: independent         Assessment/Plan:    The patient is a 71 year old male with chief complaint of left knee stiffness and pain.    The patient has the following significant findings with corresponding treatment plan.  Diagnosis 1:  Left knee pain s/p TKA on 5/3/22    Pain -  hot/cold therapy, manual therapy, splint/taping/bracing/orthotics, self management, directional preference exercise and home program  Decreased ROM/flexibility - manual therapy and therapeutic exercise  Decreased joint mobility - manual therapy and therapeutic exercise  Decreased strength - therapeutic exercise and therapeutic activities  Impaired balance - neuro re-education and therapeutic activities  Decreased proprioception - neuro re-education and therapeutic activities  Edema - vasopneumatics  Impaired  gait - gait training  Impaired muscle performance - neuro re-education  Decreased function - therapeutic activities        Therapy Evaluation Codes:    Cumulative Therapy Evaluation is: Low complexity.    Previous and current functional limitations:  (See Goal Flow Sheet for this information)    Short term and Long term goals: (See Goal Flow Sheet for this information)     Communication ability:  Patient appears to be able to clearly communicate and understand verbal and written communication and follow directions correctly.  Treatment Explanation - The following has been discussed with the patient: RX ordered/plan of care, anticipated outcomes, and possible risks and side effects.  This patient would benefit from PT intervention to resume normal activities.   Rehab potential is good.    Frequency:  1 X week, once daily  Duration:  for 8 weeks tapering to 2 X a month over 1 month  Discharge Plan: Achieve all LTGs, be independent in home treatment program, and reach maximal therapeutic benefit.    Please refer to the daily flowsheet for treatment today, total treatment time and time spent performing 1:1 timed codes.

## 2022-05-12 ENCOUNTER — OFFICE VISIT (OUTPATIENT)
Dept: ORTHOPEDICS | Facility: CLINIC | Age: 72
End: 2022-05-12
Payer: COMMERCIAL

## 2022-05-12 DIAGNOSIS — Z96.652 S/P TKR (TOTAL KNEE REPLACEMENT) USING CEMENT, LEFT: Primary | ICD-10-CM

## 2022-05-12 PROCEDURE — 99024 POSTOP FOLLOW-UP VISIT: CPT | Performed by: PHYSICIAN ASSISTANT

## 2022-05-12 NOTE — PATIENT INSTRUCTIONS
Incision care instructions:  Keep dry 24 hours.  Showering is ok after that time, however no soaking or scrubbing of incision for 2-3 weeks or all scab has resolved.  Tape-strips will most likely fall off on their own, however they may be removed after 2 weeks with rubbing alcohol if they have not.    If draining or bleeding stops the tape-strips are enough coverage unless you were instructed otherwise or you would like to cover for comfort.  If drainage or bleeding continues please cover with clean dressings.   Notify the office for drainage lasting more than a few days.    NOTE: keep clean dressing on the incision when using the wrap through 5/22/22.    Swelling:  ice and elevate whenever you are not up and moving or doing exercises.  Wrap the entire leg (foot to thigh) in the morning and remove at night.  You may need to readjust through out the day.      It is recommended that you avoid invasive procedures such as dental work, colonoscopy or other surgery for 4 months after surgery unless it is an emergency.  In the case of an emergency, please notify the provider, and an antibiotic may be prescribed for you.    You may discontinue the aspirin or return to a dose recommended by your Primary care provider 4 weeks after surgery.    You may increase your activities as you can tolerate them.  It is recommended that you do not do prolonged kneeling.    It is also recommended that you continue your exercises on your own for several additional months to avoid regressing.    Please follow up in 4 weeks.

## 2022-05-12 NOTE — PROGRESS NOTES
HISTORY OF PRESENT ILLNESS:    Ru Coombs is a 71 year old male who is seen in follow up for Left TKA, DOS 05/03/2022, Dr. Bacon.  Present symptoms: States sleeping better.  Tylenol and Ibuprofen, daily aspirin.  Ices and elevates, no compression.  Does note significant swelling that comes and goes. Walker and PT.  No new complaints.  Denies Chest pain, Calve pain, Fever, Chills.    Current Treatment: post op    PHYSICAL EXAM:  There were no vitals taken for this visit.  There is no height or weight on file to calculate BMI.   GENERAL APPEARANCE: healthy, alert and no distress   PSYCH:  mentation appears normal and affect normal/bright    MSK:  Left: Knee. .  Ambulates: presents with walker that is too short, leg motion reasonable..  Incision clean and dry, staples under aquacell present, healing.  Appropriate incisional erythema.   No Ecchymosis.  No calve pain on palpation.  Edema moderate knee to ankle.  CMS: aiyana incisional numbness, otherwise grossly intact.  AROM Flexion 5-80.      IMAGING INTERPRETATION:  XR left knee.    Images demonstrate the postoperative changes of a total knee arthroplasty.  Components appear well seated with no signs of loosening.  There is good anatomic alignment.  There are no signs of patella mal alignment or tracking issues.  No new pathology noted.    See also Dr. Bacon's read.       ASSESSMENT:  Ru Coombs is a 71 year old male S/P Left TKA.    Edema.  Walker inadequate.  Improving.    PLAN:  - Surgery discussed, images reviewed if applicable, and all questions were answered at this time.  - staples removed with sterile technique, steri-strips applied in usual fashion, care instructions given and verbally acknowledged.  - Medications: as current.  - Physical therapy: continue with current physical therapy  - Discussed using crutches he has that are appropriate height.    Return to clinic 4, weeks.    Garcia Mora PA-C    Dept. Orthopedic Surgery  [facility]    5/12/2022

## 2022-05-13 ENCOUNTER — NURSE TRIAGE (OUTPATIENT)
Dept: NURSING | Facility: CLINIC | Age: 72
End: 2022-05-13

## 2022-05-13 ENCOUNTER — THERAPY VISIT (OUTPATIENT)
Dept: PHYSICAL THERAPY | Facility: CLINIC | Age: 72
End: 2022-05-13
Payer: COMMERCIAL

## 2022-05-13 DIAGNOSIS — R26.9 ABNORMAL GAIT: ICD-10-CM

## 2022-05-13 DIAGNOSIS — Z96.652 S/P TOTAL KNEE ARTHROPLASTY, LEFT: Primary | ICD-10-CM

## 2022-05-13 PROCEDURE — 97116 GAIT TRAINING THERAPY: CPT | Mod: GP | Performed by: PHYSICAL THERAPIST

## 2022-05-13 PROCEDURE — 97110 THERAPEUTIC EXERCISES: CPT | Mod: GP | Performed by: PHYSICAL THERAPIST

## 2022-05-17 ENCOUNTER — THERAPY VISIT (OUTPATIENT)
Dept: PHYSICAL THERAPY | Facility: CLINIC | Age: 72
End: 2022-05-17
Payer: COMMERCIAL

## 2022-05-17 ENCOUNTER — MYC MEDICAL ADVICE (OUTPATIENT)
Dept: ORTHOPEDICS | Facility: CLINIC | Age: 72
End: 2022-05-17

## 2022-05-17 DIAGNOSIS — Z96.652 S/P TOTAL KNEE ARTHROPLASTY, LEFT: Primary | ICD-10-CM

## 2022-05-17 PROBLEM — R26.9 ABNORMAL GAIT: Status: ACTIVE | Noted: 2022-05-17

## 2022-05-17 PROCEDURE — 97116 GAIT TRAINING THERAPY: CPT | Mod: GP | Performed by: PHYSICAL THERAPIST

## 2022-05-17 PROCEDURE — 97110 THERAPEUTIC EXERCISES: CPT | Mod: GP | Performed by: PHYSICAL THERAPIST

## 2022-05-20 ENCOUNTER — DOCUMENTATION ONLY (OUTPATIENT)
Dept: OTHER | Facility: CLINIC | Age: 72
End: 2022-05-20
Payer: COMMERCIAL

## 2022-05-24 ENCOUNTER — THERAPY VISIT (OUTPATIENT)
Dept: PHYSICAL THERAPY | Facility: CLINIC | Age: 72
End: 2022-05-24
Payer: COMMERCIAL

## 2022-05-24 DIAGNOSIS — R26.9 ABNORMAL GAIT: ICD-10-CM

## 2022-05-24 DIAGNOSIS — Z96.652 S/P TOTAL KNEE ARTHROPLASTY, LEFT: Primary | ICD-10-CM

## 2022-05-24 PROCEDURE — 97530 THERAPEUTIC ACTIVITIES: CPT | Mod: GP | Performed by: PHYSICAL THERAPIST

## 2022-05-24 PROCEDURE — 97110 THERAPEUTIC EXERCISES: CPT | Mod: GP | Performed by: PHYSICAL THERAPIST

## 2022-06-03 ENCOUNTER — THERAPY VISIT (OUTPATIENT)
Dept: PHYSICAL THERAPY | Facility: CLINIC | Age: 72
End: 2022-06-03
Payer: COMMERCIAL

## 2022-06-03 DIAGNOSIS — Z96.652 S/P TOTAL KNEE ARTHROPLASTY, LEFT: Primary | ICD-10-CM

## 2022-06-03 DIAGNOSIS — R26.9 ABNORMAL GAIT: ICD-10-CM

## 2022-06-03 PROCEDURE — 97110 THERAPEUTIC EXERCISES: CPT | Mod: GP | Performed by: PHYSICAL THERAPIST

## 2022-06-03 PROCEDURE — 97530 THERAPEUTIC ACTIVITIES: CPT | Mod: GP | Performed by: PHYSICAL THERAPIST

## 2022-06-09 ENCOUNTER — THERAPY VISIT (OUTPATIENT)
Dept: PHYSICAL THERAPY | Facility: CLINIC | Age: 72
End: 2022-06-09
Payer: COMMERCIAL

## 2022-06-09 DIAGNOSIS — Z96.652 S/P TOTAL KNEE ARTHROPLASTY, LEFT: Primary | ICD-10-CM

## 2022-06-09 DIAGNOSIS — R26.9 ABNORMAL GAIT: ICD-10-CM

## 2022-06-09 PROCEDURE — 97110 THERAPEUTIC EXERCISES: CPT | Mod: GP | Performed by: PHYSICAL THERAPIST

## 2022-06-09 PROCEDURE — 97530 THERAPEUTIC ACTIVITIES: CPT | Mod: GP | Performed by: PHYSICAL THERAPIST

## 2022-06-09 NOTE — PROGRESS NOTES
PROGRESS  REPORT    Progress reporting period is from 5/9/22 to 6/9/22.       SUBJECTIVE  Subjective changes noted by patient:  Patient reports improving pain symptoms, ROM, swelling and overall functional mobility. Patient reports he is able to walk safely without AD. Still stiffness and pain in posterior knee and leg. This gets better with stretching and exercises.    Current Pain level: 0/10.     Initial Pain level: 5/10.   Changes in function:  Yes (See Goal flowsheet attached for changes in current functional level)  Adverse reaction to treatment or activity: None    OBJECTIVE  Changes noted in objective findings:  Yes,   Objective: left knee AROM: 0-3-108     ASSESSMENT/PLAN  Updated problem list and treatment plan: Diagnosis 1:  S/p left TKA 5/3/22  Pain -  manual therapy, self management, education, directional preference exercise and home program  Decreased ROM/flexibility - manual therapy and therapeutic exercise  Decreased joint mobility - manual therapy and therapeutic exercise  Decreased strength - therapeutic exercise and therapeutic activities  Impaired balance - neuro re-education and therapeutic activities  Decreased proprioception - neuro re-education and therapeutic activities  Impaired gait - gait training  Decreased function - therapeutic activities  Impaired posture - neuro re-education  STG/LTGs have been met or progress has been made towards goals:  Yes (See Goal flow sheet completed today.)  Assessment of Progress: The patient's condition is improving.  Self Management Plans:  Patient has been instructed in a home treatment program.  Patient  has been instructed in self management of symptoms.  I have re-evaluated this patient and find that the nature, scope, duration and intensity of the therapy is appropriate for the medical condition of the patient.  Ru continues to require the following intervention to meet STG and LTG's:  PT    Recommendations:  This patient would benefit from  continued therapy.     Frequency:  1 X week, once daily  Duration:  for 8 weeks        Please refer to the daily flowsheet for treatment today, total treatment time and time spent performing 1:1 timed codes.

## 2022-06-14 ENCOUNTER — THERAPY VISIT (OUTPATIENT)
Dept: PHYSICAL THERAPY | Facility: CLINIC | Age: 72
End: 2022-06-14
Payer: COMMERCIAL

## 2022-06-14 DIAGNOSIS — R26.9 ABNORMAL GAIT: ICD-10-CM

## 2022-06-14 DIAGNOSIS — Z96.652 S/P TOTAL KNEE ARTHROPLASTY, LEFT: Primary | ICD-10-CM

## 2022-06-14 PROCEDURE — 97110 THERAPEUTIC EXERCISES: CPT | Mod: GP | Performed by: PHYSICAL THERAPIST

## 2022-06-14 PROCEDURE — 97530 THERAPEUTIC ACTIVITIES: CPT | Mod: GP | Performed by: PHYSICAL THERAPIST

## 2022-06-16 ENCOUNTER — OFFICE VISIT (OUTPATIENT)
Dept: ORTHOPEDICS | Facility: CLINIC | Age: 72
End: 2022-06-16
Payer: COMMERCIAL

## 2022-06-16 DIAGNOSIS — Z96.652 S/P TKR (TOTAL KNEE REPLACEMENT) USING CEMENT, LEFT: Primary | ICD-10-CM

## 2022-06-16 DIAGNOSIS — Z47.89 ORTHOPEDIC AFTERCARE: ICD-10-CM

## 2022-06-16 PROCEDURE — 99024 POSTOP FOLLOW-UP VISIT: CPT | Performed by: PHYSICIAN ASSISTANT

## 2022-06-18 NOTE — PATIENT INSTRUCTIONS
Please avoid invasive procedure for 4 months following your surgery.  After the 4 months, based on recommendations by the orthopedic and dental associations, there is no need to use an antibiotic prior to any invasive procedure such as dental work, colonoscopy or surgery unless you are at risk for an infection.  Risks include cancer, immune system diseases, prior joint or chronic infections, and any medication that suppresses or weakens your immune system.  Please notify any providers of your implant prior to invasive procedures and if you have any risk factors or your health has changed since the last procedure. An antibiotic may be prescribed for you.    You may increase your activities as you can tolerate them.  It is recommended that you do not do prolonged kneeling.    It is also recommended that you continue your exercises on your own for several additional months to avoid regressing.    Please follow up with your surgeon 1 year from your surgery date for evaluation.

## 2022-06-18 NOTE — PROGRESS NOTES
HISTORY OF PRESENT ILLNESS:    Ru Coombs is a 71 year old male who is seen in follow up for Left TKA, DOS 5/07/2022, Dr. Bacon.  Present symptoms: Pt states improved function.  Up and down stairs with alternating.  Occasional OTC meds.  Working on exercises at home.  Mild edema and soreness with activities.  Denies Chest pain, Calve pain, Fever, Chills.    Current Treatment: postop.    PHYSICAL EXAM:  There were no vitals taken for this visit.  There is no height or weight on file to calculate BMI.   GENERAL APPEARANCE: healthy, alert and no distress   PSYCH:  mentation appears normal and affect normal/bright    MSK:  Left: knee .  Ambulates: WNG.  Incision clean and dry, scar present, healing.  Appropriate incisional erythema.   No Ecchymosis.  No calve pain on palpation.  Edema moderate at knee, min BK.  CMS: aiyana incisional numbness, otherwise grossly intact.  AROM Flexion 0-120.      IMAGING INTERPRETATION:  None today.     ASSESSMENT:  Ru Coombs is a 71 year old male S/P LEft TKA.  Doing well.   Edema, may continue for months, work on RICE as needed.    PLAN:  - Surgery discussed, images reviewed if applicable, and all questions were answered at this time.  - Long term care instructions given and verbally acknowledged.  - Medications: none per ortho.  - exercises at home for several months.  - discussed gradual return to activities and varied low impact for exercise.    Return to clinic 4 weeks if needed and then at 1 year postop.    Garcia Mora PA-C    Dept. Orthopedic Surgery  [facility]   6/18/2022

## 2022-06-21 ENCOUNTER — THERAPY VISIT (OUTPATIENT)
Dept: PHYSICAL THERAPY | Facility: CLINIC | Age: 72
End: 2022-06-21
Payer: COMMERCIAL

## 2022-06-21 DIAGNOSIS — Z96.652 S/P TOTAL KNEE ARTHROPLASTY, LEFT: Primary | ICD-10-CM

## 2022-06-21 DIAGNOSIS — R26.9 ABNORMAL GAIT: ICD-10-CM

## 2022-06-21 PROCEDURE — 97112 NEUROMUSCULAR REEDUCATION: CPT | Mod: GP | Performed by: PHYSICAL THERAPIST

## 2022-06-21 PROCEDURE — 97110 THERAPEUTIC EXERCISES: CPT | Mod: GP | Performed by: PHYSICAL THERAPIST

## 2022-06-21 ASSESSMENT — ACTIVITIES OF DAILY LIVING (ADL)
GIVING WAY, BUCKLING OR SHIFTING OF KNEE: I DO NOT HAVE THE SYMPTOM
SQUAT: ACTIVITY IS NOT DIFFICULT
STAND: ACTIVITY IS NOT DIFFICULT
RISE FROM A CHAIR: ACTIVITY IS NOT DIFFICULT
SWELLING: I HAVE THE SYMPTOM BUT IT DOES NOT AFFECT MY ACTIVITY
KNEE_ACTIVITY_OF_DAILY_LIVING_SUM: 62
KNEE_ACTIVITY_OF_DAILY_LIVING_SCORE: 95.39
AS_A_RESULT_OF_YOUR_KNEE_INJURY,_HOW_WOULD_YOU_RATE_YOUR_CURRENT_LEVEL_OF_DAILY_ACTIVITY?: NEARLY NORMAL
KNEEL ON THE FRONT OF YOUR KNEE: NOT ANSWERED
HOW_WOULD_YOU_RATE_THE_OVERALL_FUNCTION_OF_YOUR_KNEE_DURING_YOUR_USUAL_DAILY_ACTIVITIES?: NEARLY NORMAL
WEAKNESS: I DO NOT HAVE THE SYMPTOM
WALK: ACTIVITY IS NOT DIFFICULT
SIT WITH YOUR KNEE BENT: ACTIVITY IS NOT DIFFICULT
HOW_WOULD_YOU_RATE_THE_CURRENT_FUNCTION_OF_YOUR_KNEE_DURING_YOUR_USUAL_DAILY_ACTIVITIES_ON_A_SCALE_FROM_0_TO_100_WITH_100_BEING_YOUR_LEVEL_OF_KNEE_FUNCTION_PRIOR_TO_YOUR_INJURY_AND_0_BEING_THE_INABILITY_TO_PERFORM_ANY_OF_YOUR_USUAL_DAILY_ACTIVITIES?: 90
PAIN: I DO NOT HAVE THE SYMPTOM
GO UP STAIRS: ACTIVITY IS NOT DIFFICULT
STIFFNESS: I HAVE THE SYMPTOM BUT IT DOES NOT AFFECT MY ACTIVITY
GO DOWN STAIRS: ACTIVITY IS MINIMALLY DIFFICULT
RAW_SCORE: 66.77
LIMPING: I DO NOT HAVE THE SYMPTOM

## 2022-06-26 DIAGNOSIS — R25.1 TREMOR: ICD-10-CM

## 2022-06-28 ENCOUNTER — THERAPY VISIT (OUTPATIENT)
Dept: PHYSICAL THERAPY | Facility: CLINIC | Age: 72
End: 2022-06-28
Payer: COMMERCIAL

## 2022-06-28 DIAGNOSIS — R26.9 ABNORMAL GAIT: ICD-10-CM

## 2022-06-28 DIAGNOSIS — Z96.652 S/P TOTAL KNEE ARTHROPLASTY, LEFT: Primary | ICD-10-CM

## 2022-06-28 PROCEDURE — 97110 THERAPEUTIC EXERCISES: CPT | Mod: GP | Performed by: PHYSICAL THERAPIST

## 2022-06-28 PROCEDURE — 97112 NEUROMUSCULAR REEDUCATION: CPT | Mod: GP | Performed by: PHYSICAL THERAPIST

## 2022-06-29 RX ORDER — PROPRANOLOL HYDROCHLORIDE 60 MG/1
TABLET ORAL
Qty: 90 TABLET | Refills: 2 | Status: SHIPPED | OUTPATIENT
Start: 2022-06-29 | End: 2023-01-13

## 2022-06-30 ENCOUNTER — MYC MEDICAL ADVICE (OUTPATIENT)
Dept: ORTHOPEDICS | Facility: CLINIC | Age: 72
End: 2022-06-30

## 2022-06-30 NOTE — TELEPHONE ENCOUNTER
Phone call to patient regarding 6/30/22 [employer] message sent.   Discussed that each patient is different after surgery but he can do physical therapy for up to a couple of months and is to the therapists discretion. He certainly can ask the therapist how much longer they think he will need to continue.   Patient also states that Layo Mora PA-C had discussed with him that a blood clot can look like a long pencil and puddles or bulges at the end.   He has a bulged area of his non surgical leg that starts at the inside of his groin that is pencil like and travels down the inside of his leg and puddles at the bottom. He denies pain or redness but states the area feels firm.   Recommended he have his PCP evaluate that.   He verbalized understanding.     ELIZABETH Regan RN

## 2022-07-05 ENCOUNTER — THERAPY VISIT (OUTPATIENT)
Dept: PHYSICAL THERAPY | Facility: CLINIC | Age: 72
End: 2022-07-05
Payer: COMMERCIAL

## 2022-07-05 DIAGNOSIS — M25.551 HIP PAIN, RIGHT: ICD-10-CM

## 2022-07-05 DIAGNOSIS — Z96.652 S/P TOTAL KNEE ARTHROPLASTY, LEFT: Primary | ICD-10-CM

## 2022-07-05 DIAGNOSIS — R26.9 ABNORMAL GAIT: ICD-10-CM

## 2022-07-05 PROCEDURE — 97112 NEUROMUSCULAR REEDUCATION: CPT | Mod: GP | Performed by: PHYSICAL THERAPIST

## 2022-07-05 PROCEDURE — 97110 THERAPEUTIC EXERCISES: CPT | Mod: GP | Performed by: PHYSICAL THERAPIST

## 2022-07-12 ENCOUNTER — THERAPY VISIT (OUTPATIENT)
Dept: PHYSICAL THERAPY | Facility: CLINIC | Age: 72
End: 2022-07-12
Payer: COMMERCIAL

## 2022-07-12 DIAGNOSIS — Z96.652 S/P TOTAL KNEE ARTHROPLASTY, LEFT: Primary | ICD-10-CM

## 2022-07-12 DIAGNOSIS — R26.9 ABNORMAL GAIT: ICD-10-CM

## 2022-07-12 PROCEDURE — 97110 THERAPEUTIC EXERCISES: CPT | Mod: GP | Performed by: PHYSICAL THERAPIST

## 2022-07-12 PROCEDURE — 97112 NEUROMUSCULAR REEDUCATION: CPT | Mod: GP | Performed by: PHYSICAL THERAPIST

## 2022-07-31 ENCOUNTER — HEALTH MAINTENANCE LETTER (OUTPATIENT)
Age: 72
End: 2022-07-31

## 2022-08-04 ENCOUNTER — TELEPHONE (OUTPATIENT)
Dept: PEDIATRICS | Facility: CLINIC | Age: 72
End: 2022-08-04

## 2022-08-04 DIAGNOSIS — R41.3 MEMORY LOSS: Primary | ICD-10-CM

## 2022-08-04 NOTE — TELEPHONE ENCOUNTER
Pt called requesting a referral for a Neurologist for Alzheimer testing.     Nirmala Kim on 8/4/2022 at 12:08 PM

## 2022-08-08 NOTE — PHARMACY-ADMISSION MEDICATION HISTORY
Admission medication history interview status for this patient is complete. See [facility] admission navigator for allergy information, prior to admission medications and immunization status.     PTA med list completed by pre-admitting nurse,   Nurse Mari Frederick RN Wed Apr 20, 2022 10:44 AM       Prior to Admission medications    Medication Sig Last Dose Taking? Auth Provider   albuterol (PROAIR HFA/PROVENTIL HFA/VENTOLIN HFA) 108 (90 Base) MCG/ACT inhaler Inhale 2 puffs into the lungs every 4 hours as needed for shortness of breath / dyspnea  Yes James Chaney MD   aspirin 325 MG EC tablet Take 1 tablet (325 mg) by mouth daily  Yes James Chaney MD   calcium-vitamin D (CALTRATE) 600-400 MG-UNIT per tablet Take 1 tablet by mouth 2 times daily  Yes Reported, Patient   MULTI-VITAMIN OR TABS   Yes    Omega-3 Fatty Acids (OMEGA-3 FISH OIL PO) Take 1,000 mg by mouth once  Yes Reported, Patient   propranolol (INDERAL) 60 MG tablet TAKE ONE TABLET BY MOUTH EVERY DAY  Yes James Chaney MD   Selenium 200 MCG TABS tablet Take 1 tablet (200 mcg) by mouth daily  Yes James Chaney MD   sildenafil (REVATIO) 20 MG tablet TAKE 2 TO 5 TABLETS BY MOUTH ONCE DAILY AS NEEDED  Yes James Chaney MD   Turmeric 400 MG CAPS Take 720 mg by mouth daily  Yes James Chaney MD   ibuprofen (ADVIL/MOTRIN) 200 MG tablet Take 200-400 mg by mouth as needed   Reported, Patient          Mustarde Flap Text: The defect edges were debeveled with a #15 scalpel blade.  Given the size, depth and location of the defect and the proximity to free margins a Mustarde flap was deemed most appropriate.  Using a sterile surgical marker, an appropriate flap was drawn incorporating the defect. The area thus outlined was incised with a #15 scalpel blade.  The skin margins were undermined to an appropriate distance in all directions utilizing iris scissors.

## 2022-08-09 ENCOUNTER — THERAPY VISIT (OUTPATIENT)
Dept: PHYSICAL THERAPY | Facility: CLINIC | Age: 72
End: 2022-08-09
Payer: COMMERCIAL

## 2022-08-09 DIAGNOSIS — Z96.652 S/P TOTAL KNEE ARTHROPLASTY, LEFT: Primary | ICD-10-CM

## 2022-08-09 DIAGNOSIS — R26.9 ABNORMAL GAIT: ICD-10-CM

## 2022-08-09 PROCEDURE — 97110 THERAPEUTIC EXERCISES: CPT | Mod: GP | Performed by: PHYSICAL THERAPIST

## 2022-08-09 PROCEDURE — 97112 NEUROMUSCULAR REEDUCATION: CPT | Mod: GP | Performed by: PHYSICAL THERAPIST

## 2022-08-16 ENCOUNTER — MYC MEDICAL ADVICE (OUTPATIENT)
Dept: ORTHOPEDICS | Facility: CLINIC | Age: 72
End: 2022-08-16

## 2022-08-17 NOTE — TELEPHONE ENCOUNTER
Patient is s/p Left TKA, DOS 5/07/2022, Dr. Bacon.   At time of proposed dental cleaning, patient will be 4 months post operative.     Per chart review no history of cancer, autoimmune disease, chronic use of immunosuppressant, or history of infection.    MyChart response sent to patient to confirm health history.     Lashay Guzman, ATC

## 2022-08-19 NOTE — TELEPHONE ENCOUNTER
Please confirm that prophylactic antibiotics are not needed based on current health history.     Lashay Guzman, ATC

## 2022-08-19 NOTE — TELEPHONE ENCOUNTER
If chart is up to date, there are no diagnosis or medications that should put him at an increased risk.  After 9/7/22, there will be no need for abx proph unless his health or meds change.    Garcia Mora PA-C  [facility] - Surgery

## 2022-10-11 DIAGNOSIS — N52.9 ERECTILE DYSFUNCTION, UNSPECIFIED ERECTILE DYSFUNCTION TYPE: ICD-10-CM

## 2022-10-12 RX ORDER — SILDENAFIL CITRATE 20 MG/1
TABLET ORAL
Qty: 30 TABLET | Refills: 0 | Status: SHIPPED | OUTPATIENT
Start: 2022-10-12 | End: 2022-11-16

## 2022-10-12 NOTE — TELEPHONE ENCOUNTER
Prescription approved per [facility] Refill Protocol.  Calista Lux, RN, BSN, PHN  [facility]

## 2022-10-15 ENCOUNTER — HEALTH MAINTENANCE LETTER (OUTPATIENT)
Age: 72
End: 2022-10-15

## 2022-11-03 ENCOUNTER — NURSE TRIAGE (OUTPATIENT)
Dept: NURSING | Facility: CLINIC | Age: 72
End: 2022-11-03

## 2022-11-03 NOTE — TELEPHONE ENCOUNTER
Call placed to pt  He is not feeling dizzy now, he did drink 2 glasses of water and had breakfast this am before going to the gym    Appt made for 11/4/22 at 0840 with SDP  Pt verbalized understanding and agrees to the plan    Thank you  Oli Jimenez RN on 11/3/2022 at 3:00 PM

## 2022-11-03 NOTE — TELEPHONE ENCOUNTER
Nurse Triage SBAR    Is this a 2nd Level Triage? YES, LICENSED PRACTITIONER REVIEW IS REQUIRED    Situation: Dizziness    Background: Patient calling, states that he has been having dizziness quite frequently. He states that he went to the gym this morning with his wife and he had to have her drive home because he was feeling dizzy. He denies fainting but states he has felt like he could. Denies weakness or numbness on either side of his body. Denies headaches. Denies palpitations, denies diarrhea and vomiting.  At the time of call he was feeling better.     Assessment: Dizziness    Protocol Recommended Disposition:   See in Office Today    Recommendation:     Patient would like to be seen in the clinic today or tomorrow if possible. He thinks he may need a blood pressure and iron level check.      Routed to provider     JUANCHO ARCE RN      Does the patient meet one of the following criteria for ADS visit consideration? 16+ years old, with an [facility] PCP     TIP  Providers, please consider if this condition is appropriate for management at one of our Acute and Diagnostic Services sites.     If patient is a good candidate, please use dotphrase <dot>triageresponse and select Refer to ADS to document.    Reason for Disposition    MODERATE dizziness (e.g., interferes with normal activities) (Exception: dizziness caused by heat exposure, sudden standing, or poor fluid intake)    Additional Information    Negative: SEVERE difficulty breathing (e.g., struggling for each breath, speaks in single words)    Negative: Shock suspected (e.g., cold/pale/clammy skin, too weak to stand, low BP, rapid pulse)    Negative: Difficult to awaken or acting confused (e.g., disoriented, slurred speech)    Negative: Fainted, and still feels dizzy afterwards    Negative: Overdose (accidental or intentional) of medications    Negative: New neurologic deficit that is present now: * Weakness of the face, arm, or leg on one side of the  body * Numbness of the face, arm, or leg on one side of the body * Loss of speech or garbled speech    Negative: Heart beating < 50 beats per minute OR > 140 beats per minute    Negative: Sounds like a life-threatening emergency to the triager    Negative: Chest pain    Negative: Rectal bleeding, bloody stool, or tarry-black stool    Negative: Vomiting is main symptom    Negative: Diarrhea is main symptom    Negative: Headache is main symptom    Negative: Heat exhaustion suspected (i.e., dehydration from heat exposure)    Negative: Patient states that they are having an anxiety or panic attack    Negative: Dizziness from low blood sugar (i.e., < 60 mg/dl or 3.5 mmol/l)    Negative: SEVERE dizziness (e.g., unable to stand, requires support to walk, feels like passing out now)    Negative: SEVERE headache or neck pain    Negative: Spinning or tilting sensation (vertigo) present now and one or more stroke risk factors (i.e., hypertension, diabetes mellitus, prior stroke/TIA, heart attack, age over 60) (Exception: prior physician evaluation for this AND no different/worse than usual)    Negative: Neurologic deficit that was brief (now gone), ANY of the following:* Weakness of the face, arm, or leg on one side of the body* Numbness of the face, arm, or leg on one side of the body* Loss of speech or garbled speech    Negative: Loss of vision or double vision  (Exception: Similar to previous migraines.)    Negative: Extra heart beats OR irregular heart beating (i.e., 'palpitations')    Negative: Difficulty breathing    Negative: Drinking very little and has signs of dehydration (e.g., no urine > 12 hours, very dry mouth, very lightheaded)    Negative: Follows bleeding (e.g., stomach, rectum, vagina)  (Exception: Became dizzy from sight of small amount blood.)    Negative: Patient sounds very sick or weak to the triager    Negative: Lightheadedness (dizziness) present now, after 2 hours of rest and fluids    Negative:  Spinning or tilting sensation (vertigo) present now    Negative: Fever > 103 F (39.4 C)    Negative: Fever > 100.0 F (37.8 C) and has diabetes mellitus or a weak immune system (e.g., HIV positive, cancer chemotherapy, organ transplant, splenectomy, chronic steroids)    Protocols used: DIZZINESS-A-OH

## 2022-11-04 ENCOUNTER — OFFICE VISIT (OUTPATIENT)
Dept: PEDIATRICS | Facility: CLINIC | Age: 72
End: 2022-11-04
Payer: COMMERCIAL

## 2022-11-04 VITALS
HEART RATE: 63 BPM | RESPIRATION RATE: 20 BRPM | OXYGEN SATURATION: 99 % | DIASTOLIC BLOOD PRESSURE: 80 MMHG | BODY MASS INDEX: 28.21 KG/M2 | WEIGHT: 191 LBS | SYSTOLIC BLOOD PRESSURE: 140 MMHG | TEMPERATURE: 99 F

## 2022-11-04 DIAGNOSIS — I83.11 VARICOSE VEINS OF RIGHT LOWER EXTREMITY WITH INFLAMMATION: ICD-10-CM

## 2022-11-04 DIAGNOSIS — R42 DIZZINESS: Primary | ICD-10-CM

## 2022-11-04 PROCEDURE — 99214 OFFICE O/P EST MOD 30 MIN: CPT | Performed by: PHYSICIAN ASSISTANT

## 2022-11-04 ASSESSMENT — PAIN SCALES - GENERAL: PAINLEVEL: NO PAIN (0)

## 2022-11-04 NOTE — PROGRESS NOTES
Assessment & Plan     Dizziness  Resolved. Signs for emergent evaluation discussed with patient.    Varicose veins of right lower extremity with inflammation  Superficial thrombophlebitis likely. Begin heat at site. Follow up with vascular.   - Vascular Medicine Referral; Future    KRIS [facility] DANGELO Vences is a 72 year old presenting for the following health issues:  Dizziness    HPI   Dizziness--4 hours yesterday  lightheadedness  Onset/Duration: always gets it but yesterday was most noticeable   Description:   Do you feel faint: No  Does it feel like the surroundings (bed, room) are moving: No  Unsteady/off balance: YES  Have you passed out or fallen: No  Intensity: mild  Progression of Symptoms: intermittent  Accompanying Signs & Symptoms:  Heart palpitations or chest pain: No  Nausea, vomiting: No  Weakness or lack of coordination in arms or legs: No  Vision or speech changes: No  Numbness or tingling: No  Ringing in ears (Tinnitus): No  Hearing Loss: YES- has hearing aids   History:   Head trauma/concussion history: No  Previous similar symptoms: YES- but nothing like the episode yesterday   Recent bleeding history: No  Any new medications (BP?): No  Precipitating factors:   Worse with activity: No  Worse with head movement: YES- looking down or bending down   Alleviating factors:   Does staying in a fixed position give relief: YES  Therapies tried and outcome: None    History of allergies in fall. Nasal congestion.   No recent travel. No swimming.    Patient concerned with blood clot in right leg. Present x months. Intermittently painful.    Review of Systems   Constitutional, HEENT, cardiovascular, pulmonary, GI, , musculoskeletal, neuro, skin, endocrine and psych systems are negative, except as otherwise noted.      Objective    BP (!) 140/80 (BP Location: Right arm, Patient Position: Sitting, Cuff Size: Adult Regular)   Pulse 63   Temp 99  F  (37.2  C) (Temporal)   Resp 20   Wt 86.6 kg (191 lb)   SpO2 99%   BMI 28.21 kg/m    Body mass index is 28.21 kg/m .  Physical Exam   GENERAL: alert and no distress  EYES: Eyes grossly normal to inspection, PERRL and conjunctivae and sclerae normal  HENT: ear canals and TM's normal, nose and mouth without ulcers or lesions  NECK: no adenopathy  RESP: lungs clear to auscultation - no rales, rhonchi or wheezes  CV: regular rate and rhythm, normal S1 S2, no S3 or S4  ABDOMEN: soft, nontender  MS: no gross musculoskeletal defects noted, no edema  NEURO: Normal strength and tone, sensory exam grossly normal, mentation intact and cranial nerves 2-12 intact  SKIN: inspection of the right medial thigh reveals large varicosities with palpable cord. Mildly tender.     No results found for any visits on 11/04/22.

## 2022-11-16 ENCOUNTER — OFFICE VISIT (OUTPATIENT)
Dept: VASCULAR SURGERY | Facility: CLINIC | Age: 72
End: 2022-11-16
Attending: PHYSICIAN ASSISTANT
Payer: COMMERCIAL

## 2022-11-16 DIAGNOSIS — I83.11 VARICOSE VEINS OF RIGHT LOWER EXTREMITY WITH INFLAMMATION: ICD-10-CM

## 2022-11-16 PROCEDURE — 99203 OFFICE O/P NEW LOW 30 MIN: CPT | Performed by: SURGERY

## 2022-11-16 NOTE — NURSING NOTE
Patient Reported symptoms:    Right leg   Heaviness None of the time   Achiness None of the time   Swelling A little of the time   Throbbing None of the time   Itching None of the time   Appearance Moderately noticeable   Impact on work/activities Symptoms but full able to participate
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
## 2022-12-05 NOTE — TELEPHONE ENCOUNTER
FUTURE VISIT INFORMATION      FUTURE VISIT INFORMATION:    Date: 1/27/2023    Time: 8am    Location: [facility] [place]   REFERRAL INFORMATION:    Referring provider:  Dr. Chaney     Referring providers clinic:  [facility]     Reason for visit/diagnosis  Memory Loss     RECORDS REQUESTED FROM:       Clinic name Comments Records Status Imaging Status   Internal  VMikey Navarrete-11/4/2022 Epic No Images

## 2023-01-13 ENCOUNTER — OFFICE VISIT (OUTPATIENT)
Dept: PEDIATRICS | Facility: CLINIC | Age: 73
End: 2023-01-13
Payer: COMMERCIAL

## 2023-01-13 VITALS
SYSTOLIC BLOOD PRESSURE: 120 MMHG | HEART RATE: 53 BPM | TEMPERATURE: 97.4 F | BODY MASS INDEX: 28.99 KG/M2 | DIASTOLIC BLOOD PRESSURE: 80 MMHG | HEIGHT: 68 IN | WEIGHT: 191.3 LBS | OXYGEN SATURATION: 97 % | RESPIRATION RATE: 20 BRPM

## 2023-01-13 DIAGNOSIS — Z12.5 SCREENING FOR PROSTATE CANCER: ICD-10-CM

## 2023-01-13 DIAGNOSIS — R25.1 TREMOR: ICD-10-CM

## 2023-01-13 DIAGNOSIS — N52.9 ERECTILE DYSFUNCTION, UNSPECIFIED ERECTILE DYSFUNCTION TYPE: ICD-10-CM

## 2023-01-13 DIAGNOSIS — Z00.00 ROUTINE GENERAL MEDICAL EXAMINATION AT A HEALTH CARE FACILITY: Primary | ICD-10-CM

## 2023-01-13 LAB
ALBUMIN SERPL BCG-MCNC: 4.2 G/DL (ref 3.5–5.2)
ALP SERPL-CCNC: 75 U/L (ref 40–129)
ALT SERPL W P-5'-P-CCNC: 16 U/L (ref 10–50)
ANION GAP SERPL CALCULATED.3IONS-SCNC: 10 MMOL/L (ref 7–15)
AST SERPL W P-5'-P-CCNC: 28 U/L (ref 10–50)
BILIRUB SERPL-MCNC: 0.6 MG/DL
BUN SERPL-MCNC: 19.6 MG/DL (ref 8–23)
CALCIUM SERPL-MCNC: 9.4 MG/DL (ref 8.8–10.2)
CHLORIDE SERPL-SCNC: 104 MMOL/L (ref 98–107)
CHOLEST SERPL-MCNC: 166 MG/DL
CREAT SERPL-MCNC: 0.82 MG/DL (ref 0.67–1.17)
DEPRECATED HCO3 PLAS-SCNC: 24 MMOL/L (ref 22–29)
GFR SERPL CREATININE-BSD FRML MDRD: >90 ML/MIN/1.73M2
GLUCOSE SERPL-MCNC: 105 MG/DL (ref 70–99)
HDLC SERPL-MCNC: 52 MG/DL
LDLC SERPL CALC-MCNC: 101 MG/DL
NONHDLC SERPL-MCNC: 114 MG/DL
POTASSIUM SERPL-SCNC: 5.3 MMOL/L (ref 3.4–5.3)
PROT SERPL-MCNC: 7.1 G/DL (ref 6.4–8.3)
PSA SERPL-MCNC: 3.04 NG/ML (ref 0–6.5)
SODIUM SERPL-SCNC: 138 MMOL/L (ref 136–145)
TRIGL SERPL-MCNC: 66 MG/DL

## 2023-01-13 PROCEDURE — 36415 COLL VENOUS BLD VENIPUNCTURE: CPT | Performed by: INTERNAL MEDICINE

## 2023-01-13 PROCEDURE — 99397 PER PM REEVAL EST PAT 65+ YR: CPT | Performed by: INTERNAL MEDICINE

## 2023-01-13 PROCEDURE — 80053 COMPREHEN METABOLIC PANEL: CPT | Performed by: INTERNAL MEDICINE

## 2023-01-13 PROCEDURE — G0103 PSA SCREENING: HCPCS | Performed by: INTERNAL MEDICINE

## 2023-01-13 PROCEDURE — 80061 LIPID PANEL: CPT | Performed by: INTERNAL MEDICINE

## 2023-01-13 RX ORDER — PROPRANOLOL HYDROCHLORIDE 60 MG/1
1 TABLET ORAL DAILY
Qty: 90 TABLET | Refills: 4 | Status: SHIPPED | OUTPATIENT
Start: 2023-01-13

## 2023-01-13 RX ORDER — SILDENAFIL CITRATE 20 MG/1
TABLET ORAL
Qty: 30 TABLET | Refills: 11 | Status: SHIPPED | OUTPATIENT
Start: 2023-01-13

## 2023-01-13 ASSESSMENT — ENCOUNTER SYMPTOMS
SHORTNESS OF BREATH: 1
FREQUENCY: 0
SORE THROAT: 0
DYSURIA: 0
CHILLS: 0
ABDOMINAL PAIN: 0
HEADACHES: 0
CONSTIPATION: 1
MYALGIAS: 0
PALPITATIONS: 0
NERVOUS/ANXIOUS: 0
NAUSEA: 0
JOINT SWELLING: 0
FEVER: 0
HEMATOCHEZIA: 0
HEMATURIA: 0
HEARTBURN: 0
WEAKNESS: 0
COUGH: 0
EYE PAIN: 0
DIARRHEA: 1
ARTHRALGIAS: 0
DIZZINESS: 0
PARESTHESIAS: 0

## 2023-01-13 ASSESSMENT — PAIN SCALES - GENERAL: PAINLEVEL: NO PAIN (0)

## 2023-01-13 ASSESSMENT — ACTIVITIES OF DAILY LIVING (ADL): CURRENT_FUNCTION: NO ASSISTANCE NEEDED

## 2023-01-13 NOTE — PROGRESS NOTES
"SUBJECTIVE:   Ru is a 72 year old who presents for Preventive Visit.    Patient has been advised of split billing requirements and indicates understanding: Yes  Are you in the first 12 months of your Medicare coverage?  No    Healthy Habits:     In general, how would you rate your overall health?  Good    Frequency of exercise:  6-7 days/week    Duration of exercise:  45-60 minutes    Do you usually eat at least 4 servings of fruit and vegetables a day, include whole grains    & fiber and avoid regularly eating high fat or \"junk\" foods?  Yes    Taking medications regularly:  Yes    Medication side effects:  Not applicable    Ability to successfully perform activities of daily living:  No assistance needed    Home Safety:  No safety concerns identified    Hearing Impairment:  No hearing concerns    In the past 6 months, have you been bothered by leaking of urine?  No    In general, how would you rate your overall mental or emotional health?  Good      PHQ-2 Total Score: 0    Additional concerns today:  No    Here for AWV.  Overall feeling well.    Tremor. Helped by meds. No other neuro sx noted.    ED. Helped by sildenafil.    Has intermittent abdominal cramping and loose stools. Occurs an hour or two after eating. Will last for a few hours then seth. No specific foods identified. Can eat higher fat foods w/o onset of sx.     Have you ever done Advance Care Planning? (For example, a Health Directive, POLST, or a discussion with a medical provider or your loved ones about your wishes): Yes, patient states has an Advance Care Planning document and will bring a copy to the clinic.       Fall risk  Fallen 2 or more times in the past year?: No  Any fall with injury in the past year?: No    Cognitive Screening   1) Repeat 3 items (Leader, Season, Table)    2) Clock draw: NORMAL  3) 3 item recall: Recalls 3 objects  Results: 3 items recalled: COGNITIVE IMPAIRMENT LESS LIKELY    Mini-CogTM Copyright S Lety. Licensed by " the author for use in [facility]; reprinted with permission (artem@[facility]). All rights reserved.      Do you have sleep apnea, excessive snoring or daytime drowsiness?: no      Social History     Tobacco Use     Smoking status: Former     Packs/day: 3.00     Years: 10.00     Pack years: 30.00     Types: Cigarettes     Quit date: 1988     Years since quittin.0     Smokeless tobacco: Never     Tobacco comments:     quit    Substance Use Topics     Alcohol use: Yes     Alcohol/week: 0.0 standard drinks     Comment: No more than 3 drinks per week      If you drink alcohol do you typically have >3 drinks per day or >7 drinks per week? No    Alcohol Use 2023   Prescreen: >3 drinks/day or >7 drinks/week? No   Prescreen: >3 drinks/day or >7 drinks/week? -       Current providers sharing in care for this patient include:   Patient Care Team:  James Chaney MD as PCP - General  James Chaney MD as Assigned PCP  Georges Pace as Personal Advocate & Liaison (PAL)  Garcia Mora PA-C as Assigned Musculoskeletal Provider  Jitendra James DO as MD (Neurology)  Rambo Subramanian MD as Assigned Heart and Vascular Provider    The following health maintenance items are reviewed in Epic and correct as of today:  Health Maintenance   Topic Date Due     ANNUAL REVIEW OF HM ORDERS  Never done     MEDICARE ANNUAL WELLNESS VISIT  2022     FALL RISK ASSESSMENT  2024     COLORECTAL CANCER SCREENING  2025     DTAP/TDAP/TD IMMUNIZATION (3 - Td or Tdap) 2026     LIPID  2026     ADVANCE CARE PLANNING  2027     HEPATITIS C SCREENING  Completed     PHQ-2 (once per calendar year)  Completed     INFLUENZA VACCINE  Completed     Pneumococcal Vaccine: 65+ Years  Completed     ZOSTER IMMUNIZATION  Completed     AORTIC ANEURYSM SCREENING (SYSTEM ASSIGNED)  Completed     COVID-19 Vaccine  Completed     IPV IMMUNIZATION  Aged Out     MENINGITIS  "IMMUNIZATION  Aged Out         Review of Systems   Constitutional: Negative for chills and fever.   HENT: Negative for congestion, ear pain, hearing loss and sore throat.    Eyes: Negative for pain and visual disturbance.   Respiratory: Positive for shortness of breath. Negative for cough.    Cardiovascular: Negative for chest pain, palpitations and peripheral edema.   Gastrointestinal: Positive for constipation and diarrhea. Negative for abdominal pain, heartburn, hematochezia and nausea.   Genitourinary: Positive for impotence. Negative for dysuria, frequency, genital sores, hematuria and urgency.   Musculoskeletal: Negative for arthralgias, joint swelling and myalgias.   Skin: Negative for rash.   Neurological: Negative for dizziness, weakness, headaches and paresthesias.   Psychiatric/Behavioral: Negative for mood changes. The patient is not nervous/anxious.          OBJECTIVE:   /80 (BP Location: Right arm, Patient Position: Sitting, Cuff Size: Adult Regular)   Pulse 53   Temp 97.4  F (36.3  C) (Tympanic)   Resp 20   Ht 1.738 m (5' 8.43\")   Wt 86.8 kg (191 lb 4.8 oz)   SpO2 97%   BMI 28.73 kg/m   Estimated body mass index is 28.73 kg/m  as calculated from the following:    Height as of this encounter: 1.738 m (5' 8.43\").    Weight as of this encounter: 86.8 kg (191 lb 4.8 oz).  Physical Exam  GENERAL: healthy, alert and no distress  EYES: PERRL, EOMI  HENT: ear canals and TM's normal  NECK: no adenopathy  RESP: lungs clear to auscultation - no rales, rhonchi or wheezes  CV: regular rate and rhythm, normal S1 S2, no murmur, no peripheral edema and peripheral pulses strong  ABDOMEN: soft, nontender, bowel sounds normal  MS: no gross musculoskeletal defects noted, no edema  SKIN: no suspicious lesions or rashes  NEURO: Normal strength and tone  PSYCH: mentation appears normal, affect normal/bright       ASSESSMENT / PLAN:       ICD-10-CM    1. Routine general medical examination at a [facility] " "facility  Z00.00 Lipid panel reflex to direct LDL Fasting     Comprehensive metabolic panel     CANCELED: Basic metabolic panel      2. Tremor  R25.1 propranolol (INDERAL) 60 MG tablet      3. Erectile dysfunction, unspecified erectile dysfunction type  N52.9 sildenafil (REVATIO) 20 MG tablet      4. Screening for prostate cancer  Z12.5 Prostate Specific Antigen Screen        Tremor - overall adequately controlled.  ED - helped by sildenafil      COUNSELING:  Reviewed preventive health counseling, as reflected in patient instructions      BMI:   Estimated body mass index is 28.73 kg/m  as calculated from the following:    Height as of this encounter: 1.738 m (5' 8.43\").    Weight as of this encounter: 86.8 kg (191 lb 4.8 oz).         He reports that he quit smoking about 35 years ago. His smoking use included cigarettes. He has a 30.00 pack-year smoking history. He has never used smokeless tobacco.      Appropriate preventive services were discussed with this patient, including applicable screening as appropriate for cardiovascular disease, diabetes, osteopenia/osteoporosis, and glaucoma.  As appropriate for age/gender, discussed screening for colorectal cancer, prostate cancer. Checklist reviewing preventive services available has been given to the patient.    Reviewed patients plan of care and provided an AVS. The Basic Care Plan (routine screening as documented in Health Maintenance) for Ru meets the Care Plan requirement. This Care Plan has been established and reviewed with the Patient.      James Chaney MD  [facility]    Identified Health Risks:  "

## 2023-01-15 ENCOUNTER — MYC MEDICAL ADVICE (OUTPATIENT)
Dept: PEDIATRICS | Facility: CLINIC | Age: 73
End: 2023-01-15
Payer: COMMERCIAL

## 2023-01-27 ENCOUNTER — PRE VISIT (OUTPATIENT)
Dept: NEUROLOGY | Facility: CLINIC | Age: 73
End: 2023-01-27

## 2023-04-08 NOTE — TELEPHONE ENCOUNTER
Calcium score reviewed.  Result is at the 60%ile.  I called and LM on VM.  Recommendations:  Regular exercise, lower fat diet.  LDL is well controlled   Lab Results   Component Value Date    LDL 84 12/20/2016     BP has been well controlled  BP Readings from Last 2 Encounters:   12/20/16 122/72   12/14/15 124/70     Continue w/ daily ASA.    
Pt called. Reviewed results and recommendations.   
Pt calling back, notified as below. Pt says that he has more questions to consult with . Requesting a call back from provider tomorrow at his convenience. Thanks.    Dayanna, RN  Triage Nurse          
Pt notifies that he had heart CT(coronary artery calcium scoring) done on 05/24 through [facility]. Would like to know whether  received the test result. If so, he would like to know the result from him. Pt can be reached at [phone](OK to LM).     Received fax from [facility]. Left in 's in-basket to review.     Dayanna, RN  Triage Nurse            
brought to ED for neck pain since last night after playing "see saw"

## 2023-08-02 ENCOUNTER — NURSE TRIAGE (OUTPATIENT)
Dept: PEDIATRICS | Facility: CLINIC | Age: 73
End: 2023-08-02
Payer: COMMERCIAL

## 2023-08-02 NOTE — TELEPHONE ENCOUNTER
Nurse Triage SBAR    Is this a 2nd Level Triage? NO    Situation: Pt calling to report worsening hip pain and brown spot on right chest.    Background: Pt reports hx of arthritis and knee replacement. Pt states he is having left leg to hip pain for the past 6 months. Pt reports he takes ibuprofen and uses ice packs occasionally. Pt reports walking makes his pain better, and sitting for long periods or using the stairs worsens his pain. Pt states he is mobile and can walk around. Pt reports he is currently moving out and hasn't been to the gym recently.     Assessment: Pt reports intermittent aching pain in his left hip rated 6/10. Pt denies redness, swelling, numbness, tingling, weakness, rash, fever or pain radiating. Pt also endorses a dime size dark brown spot on right chest area.    Protocol Recommended Disposition:   See in Office Within 3 Days    Recommendation: Recommended pt be see at [facility] in the next 3 days per protocol. Pt verbalized understanding. Pt states he would like to speak with his insurance before scheduling an appointment. Pt denies further questions.    Reason for Disposition   MODERATE pain (e.g., interferes with normal activities, limping) and present > 3 days    Additional Information   Negative: Looks like a broken bone or dislocated joint (e.g., crooked or deformed)   Negative: Sounds like a life-threatening emergency to the triager   Negative: SEVERE pain (e.g., excruciating, unable to do any normal activities) and fever   Negative: Can't stand (bear weight) or walk   Negative: Fever and red area (or area very tender to touch)   Negative: Patient sounds very sick or weak to the triager   Negative: SEVERE pain (e.g., excruciating, unable to do any normal activities)   Negative: Red area or streak > 2 inches (or 5 cm)   Negative: Painful rash with multiple small blisters grouped together (i.e., dermatomal distribution or 'band' or 'stripe')   Negative: Looks like a boil, infected sore, deep  ulcer, or other infected rash (spreading redness, pus)   Negative: Localized rash is very painful (no fever)   Negative: Numbness in a leg or foot (i.e., loss of sensation)   Negative: Patient wants to be seen    Protocols used: Hip Pain-A-[place]    Leonardo Burns RN on 8/2/2023 at 1:12 PM     status post fall from tree

## 2023-11-29 DIAGNOSIS — R06.02 SHORTNESS OF BREATH: ICD-10-CM

## 2023-11-29 RX ORDER — ALBUTEROL SULFATE 90 UG/1
2 AEROSOL, METERED RESPIRATORY (INHALATION) EVERY 4 HOURS PRN
Qty: 18 G | Refills: 2 | Status: SHIPPED | OUTPATIENT
Start: 2023-11-29

## 2024-03-17 ENCOUNTER — HEALTH MAINTENANCE LETTER (OUTPATIENT)
Age: 74
End: 2024-03-17

## 2025-03-22 ENCOUNTER — HEALTH MAINTENANCE LETTER (OUTPATIENT)
Age: 75
End: 2025-03-22

## 2025-06-04 ENCOUNTER — MYC MEDICAL ADVICE (OUTPATIENT)
Dept: PEDIATRICS | Facility: CLINIC | Age: 75
End: 2025-06-04
Payer: COMMERCIAL

## 2025-06-05 NOTE — TELEPHONE ENCOUNTER
Please see patient MyChart message  -inquiring if refills are available for sildenafil as his have     Per chart review  LOV 23 with Dr. Chaney     sildenafil (REVATIO) 20 MG tablet 30 tablet 11 2023 -- No   Si-5 pills daily as needed     RN recommended E-visit (per clinic policy) in order to address patient request for new prescription as has not been seen since .      Deyanira Aguila RN

## (undated) DEVICE — SU VICRYL 2-0 CT-1 27" UND J259H

## (undated) DEVICE — LINEN HALF SHEET 5512

## (undated) DEVICE — SUCTION MANIFOLD NEPTUNE 2 SYS 4 PORT 0702-020-000

## (undated) DEVICE — LINEN ORTHO ACL PACK 5447

## (undated) DEVICE — IMM KNEE 20"

## (undated) DEVICE — BONE CEMENT MIXEVAC III HI VAC KIT  0206-015-000

## (undated) DEVICE — DRAIN HEMOVAC RESERVOIR KIT 10FR 1/8" MED 00-2550-002-10

## (undated) DEVICE — DRSG ABDOMINAL 07 1/2X8" 7197D

## (undated) DEVICE — TOURNIQUET SGL  BLADDER 30"X4" BLUE 5921030135

## (undated) DEVICE — CAST PADDING 6" STERILE 9046S

## (undated) DEVICE — GLOVE PROTEXIS BLUE W/NEU-THERA 8.0  2D73EB80

## (undated) DEVICE — SOL NACL 0.9% IRRIG 1000ML BOTTLE 07138-09

## (undated) DEVICE — SET HANDPIECE INTERPULSE W/COAXIAL FAN SPRAY TIP 0210118000

## (undated) DEVICE — PREP CHLORAPREP 26ML TINTED HI-LITE ORANGE 930815

## (undated) DEVICE — SOL NACL 0.9% IRRIG 3000ML BAG 2B7477

## (undated) DEVICE — PACK TOTAL KNEE BOXED LATEX FREE PO15TKFCT

## (undated) DEVICE — BAG CLEAR TRASH 1.3M 39X33" P4040C

## (undated) DEVICE — DRSG ADAPTIC 3X8" 6113

## (undated) DEVICE — LINEN FULL SHEET 5511

## (undated) DEVICE — BLADE SAW SAGITTAL STRK 18X90X1.27MM HD SYS 6 6118-127-090

## (undated) DEVICE — DRSG GAUZE 4X4" TRAY

## (undated) DEVICE — SU VICRYL 1 MO-4 18" J702D

## (undated) DEVICE — GLOVE PROTEXIS POWDER FREE 7.5 ORTHOPEDIC 2D73ET75

## (undated) RX ORDER — GLYCOPYRROLATE 0.2 MG/ML
INJECTION INTRAMUSCULAR; INTRAVENOUS
Status: DISPENSED
Start: 2022-05-03

## (undated) RX ORDER — DEXAMETHASONE SODIUM PHOSPHATE 4 MG/ML
INJECTION, SOLUTION INTRA-ARTICULAR; INTRALESIONAL; INTRAMUSCULAR; INTRAVENOUS; SOFT TISSUE
Status: DISPENSED
Start: 2022-05-03

## (undated) RX ORDER — LIDOCAINE HYDROCHLORIDE 10 MG/ML
INJECTION, SOLUTION EPIDURAL; INFILTRATION; INTRACAUDAL; PERINEURAL
Status: DISPENSED
Start: 2022-05-03

## (undated) RX ORDER — ATROPINE SULFATE 0.4 MG/ML
AMPUL (ML) INJECTION
Status: DISPENSED
Start: 2022-05-03

## (undated) RX ORDER — EPHEDRINE SULFATE 50 MG/ML
INJECTION, SOLUTION INTRAMUSCULAR; INTRAVENOUS; SUBCUTANEOUS
Status: DISPENSED
Start: 2022-05-03

## (undated) RX ORDER — ONDANSETRON 2 MG/ML
INJECTION INTRAMUSCULAR; INTRAVENOUS
Status: DISPENSED
Start: 2022-05-03

## (undated) RX ORDER — TRANEXAMIC ACID 650 MG/1
TABLET ORAL
Status: DISPENSED
Start: 2022-05-03

## (undated) RX ORDER — CEFAZOLIN SODIUM/WATER 2 G/20 ML
SYRINGE (ML) INTRAVENOUS
Status: DISPENSED
Start: 2022-05-03

## (undated) RX ORDER — CELECOXIB 200 MG/1
CAPSULE ORAL
Status: DISPENSED
Start: 2022-05-03

## (undated) RX ORDER — ACETAMINOPHEN 325 MG/1
TABLET ORAL
Status: DISPENSED
Start: 2022-05-03

## (undated) RX ORDER — FENTANYL CITRATE 50 UG/ML
INJECTION, SOLUTION INTRAMUSCULAR; INTRAVENOUS
Status: DISPENSED
Start: 2022-05-03